# Patient Record
Sex: FEMALE | Race: WHITE | NOT HISPANIC OR LATINO | URBAN - METROPOLITAN AREA
[De-identification: names, ages, dates, MRNs, and addresses within clinical notes are randomized per-mention and may not be internally consistent; named-entity substitution may affect disease eponyms.]

---

## 2017-07-20 ENCOUNTER — CONVERSION ENCOUNTER (OUTPATIENT)
Dept: MAMMOGRAPHY | Facility: CLINIC | Age: 57
End: 2017-07-20

## 2018-09-06 DIAGNOSIS — E03.9 HYPOTHYROIDISM, UNSPECIFIED TYPE: Primary | ICD-10-CM

## 2018-09-06 NOTE — TELEPHONE ENCOUNTER
Pt called needed a new script for her levothyroxine 100 mcg pt has not been here since 10/04/2018 so I called the pharmacy and ordered her a 30 day supply with no refills   Pt has been notified that she must make an appt within the next 30 days

## 2018-09-10 RX ORDER — LEVOTHYROXINE SODIUM 0.1 MG/1
100 TABLET ORAL DAILY
Qty: 30 TABLET | Refills: 0 | Status: SHIPPED | OUTPATIENT
Start: 2018-09-10 | End: 2019-01-28 | Stop reason: SDUPTHER

## 2018-09-18 RX ORDER — VENLAFAXINE 50 MG/1
150 TABLET ORAL DAILY
COMMUNITY
End: 2018-12-02

## 2018-09-18 RX ORDER — FAMOTIDINE 20 MG/1
TABLET, FILM COATED ORAL EVERY 12 HOURS
COMMUNITY
End: 2018-12-02

## 2018-09-18 RX ORDER — LOSARTAN POTASSIUM 100 MG/1
100 TABLET ORAL DAILY
Refills: 0 | COMMUNITY
Start: 2018-07-23 | End: 2019-04-05 | Stop reason: SDUPTHER

## 2018-09-18 RX ORDER — VENLAFAXINE 75 MG/1
TABLET ORAL EVERY 12 HOURS
COMMUNITY
End: 2018-12-02

## 2018-09-18 RX ORDER — INFLUENZA VIRUS VACCINE 15; 15; 15; 15 UG/.5ML; UG/.5ML; UG/.5ML; UG/.5ML
SUSPENSION INTRAMUSCULAR
Refills: 0 | COMMUNITY
Start: 2018-09-04

## 2018-09-18 RX ORDER — VENLAFAXINE HYDROCHLORIDE 150 MG/1
150 CAPSULE, EXTENDED RELEASE ORAL DAILY
Refills: 0 | COMMUNITY
Start: 2018-07-05

## 2018-09-19 ENCOUNTER — OFFICE VISIT (OUTPATIENT)
Dept: FAMILY MEDICINE CLINIC | Facility: CLINIC | Age: 58
End: 2018-09-19
Payer: COMMERCIAL

## 2018-09-19 VITALS
HEART RATE: 80 BPM | HEIGHT: 65 IN | RESPIRATION RATE: 18 BRPM | WEIGHT: 218.4 LBS | SYSTOLIC BLOOD PRESSURE: 120 MMHG | DIASTOLIC BLOOD PRESSURE: 80 MMHG | BODY MASS INDEX: 36.39 KG/M2

## 2018-09-19 DIAGNOSIS — F32.A DEPRESSION, UNSPECIFIED DEPRESSION TYPE: ICD-10-CM

## 2018-09-19 DIAGNOSIS — I10 ESSENTIAL HYPERTENSION: ICD-10-CM

## 2018-09-19 DIAGNOSIS — E78.2 MIXED HYPERLIPIDEMIA: ICD-10-CM

## 2018-09-19 DIAGNOSIS — E55.9 VITAMIN D DEFICIENCY: ICD-10-CM

## 2018-09-19 DIAGNOSIS — E03.9 ACQUIRED HYPOTHYROIDISM: Primary | ICD-10-CM

## 2018-09-19 PROCEDURE — 99396 PREV VISIT EST AGE 40-64: CPT | Performed by: SPECIALIST

## 2018-09-19 RX ORDER — LOSARTAN POTASSIUM 100 MG/1
TABLET ORAL EVERY 24 HOURS
COMMUNITY
Start: 2017-09-22 | End: 2018-11-05 | Stop reason: SDUPTHER

## 2018-09-19 RX ORDER — FAMOTIDINE 20 MG/1
TABLET, FILM COATED ORAL EVERY 12 HOURS
COMMUNITY

## 2018-09-19 RX ORDER — LEVOTHYROXINE SODIUM 0.1 MG/1
TABLET ORAL EVERY 24 HOURS
COMMUNITY
Start: 2017-07-19 | End: 2018-11-05 | Stop reason: SDUPTHER

## 2018-09-19 NOTE — PROGRESS NOTES
Assessment/Plan: well  Check up   bp fine  Needs  Thyroid tests         Diagnoses and all orders for this visit:    Acquired hypothyroidism  -     CBC and differential; Future  -     Comprehensive metabolic panel; Future  -     Lipid panel; Future  -     T4, free; Future  -     TSH, 3rd generation; Future  -     Vitamin D 25 hydroxy; Future  -     UA w Reflex to Microscopic w Reflex to Culture -Lab Collect    Essential hypertension  -     CBC and differential; Future  -     Comprehensive metabolic panel; Future  -     Lipid panel; Future  -     T4, free; Future  -     TSH, 3rd generation; Future  -     Vitamin D 25 hydroxy; Future  -     UA w Reflex to Microscopic w Reflex to Culture -Lab Collect    Mixed hyperlipidemia  -     CBC and differential; Future  -     Comprehensive metabolic panel; Future  -     Lipid panel; Future  -     T4, free; Future  -     TSH, 3rd generation; Future  -     Vitamin D 25 hydroxy; Future  -     UA w Reflex to Microscopic w Reflex to Culture -Lab Collect    Vitamin D deficiency  -     CBC and differential; Future  -     Comprehensive metabolic panel; Future  -     Lipid panel; Future  -     T4, free; Future  -     TSH, 3rd generation; Future  -     Vitamin D 25 hydroxy; Future  -     UA w Reflex to Microscopic w Reflex to Culture -Lab Collect    Depression, unspecified depression type  -     CBC and differential; Future  -     Comprehensive metabolic panel; Future  -     Lipid panel; Future  -     T4, free; Future  -     TSH, 3rd generation; Future  -     Vitamin D 25 hydroxy; Future  -     UA w Reflex to Microscopic w Reflex to Culture -Lab Collect    Other orders  -     famotidine (PEPCID) 20 mg tablet; Every 12 hours  -     FLUARIX QUADRIVALENT 0 5 ML ADRIANA; inject 0 5 milliliter intramuscularly  -     losartan (COZAAR) 100 MG tablet; Take 100 mg by mouth daily  -     venlafaxine (EFFEXOR) 50 mg tablet; Every 12 hours  -     venlafaxine (EFFEXOR) 75 mg tablet;  Every 12 hours  - venlafaxine (EFFEXOR-XR) 150 mg 24 hr capsule; Take 150 mg by mouth daily  -     famotidine (PEPCID) 20 mg tablet; Every 12 hours  -     levothyroxine 100 mcg tablet; every 24 hours  -     losartan (COZAAR) 100 MG tablet; every 24 hours          Subjective:      Patient ID: Malachi Deleon is a 63 y o  female  64 yo  Doing   No  Acute  Complaints    Needs  Gyn  anf =d  Dental  care        The following portions of the patient's history were reviewed and updated as appropriate: allergies, current medications, past family history, past medical history, past social history, past surgical history and problem list     Review of Systems   Constitutional: Negative for activity change, appetite change, chills, diaphoresis, fatigue, fever and unexpected weight change  HENT: Negative for sinus pressure and sneezing  Eyes: Negative for visual disturbance  Respiratory: Negative for cough, chest tightness and wheezing  Gastrointestinal: Negative for abdominal distention and abdominal pain  Genitourinary: Negative for dysuria  Musculoskeletal: Positive for back pain  Negative for arthralgias, gait problem, joint swelling, myalgias and neck pain  Neurological: Negative for dizziness, tremors, seizures, syncope, facial asymmetry, speech difficulty, weakness, light-headedness, numbness and headaches  Psychiatric/Behavioral: Negative for agitation, behavioral problems, confusion, decreased concentration, dysphoric mood, hallucinations and self-injury  The patient is not nervous/anxious and is not hyperactive  Objective:      /80 (BP Location: Left arm, Patient Position: Sitting, Cuff Size: Standard)   Pulse 80   Resp 18   Ht 5' 5" (1 651 m)   Wt 99 1 kg (218 lb 6 4 oz)   BMI 36 34 kg/m²          Physical Exam   Constitutional: She is oriented to person, place, and time  She appears well-developed and well-nourished  No distress  HENT:   Head: Normocephalic     Right Ear: External ear normal  Left Ear: External ear normal    Mouth/Throat: Oropharynx is clear and moist    Eyes: Conjunctivae are normal  Pupils are equal, round, and reactive to light  Right eye exhibits no discharge  Left eye exhibits no discharge  No scleral icterus  Neck: JVD present  Cardiovascular: Normal rate, regular rhythm, normal heart sounds and intact distal pulses  Exam reveals no gallop  No murmur heard  Pulmonary/Chest: Effort normal  No respiratory distress  She has no wheezes  She has no rales  Abdominal: She exhibits no distension  Musculoskeletal: She exhibits no edema, tenderness or deformity  Neurological: She is alert and oriented to person, place, and time  Skin: Skin is warm and dry  No rash noted  She is not diaphoretic  No erythema  No pallor  Psychiatric: She has a normal mood and affect   Her behavior is normal

## 2018-10-10 DIAGNOSIS — E03.9 HYPOTHYROIDISM, UNSPECIFIED TYPE: Primary | ICD-10-CM

## 2018-10-10 RX ORDER — LEVOTHYROXINE SODIUM 0.1 MG/1
TABLET ORAL
Qty: 30 TABLET | Refills: 0 | Status: SHIPPED | OUTPATIENT
Start: 2018-10-10 | End: 2018-11-05 | Stop reason: SDUPTHER

## 2018-11-05 DIAGNOSIS — E03.9 HYPOTHYROIDISM, UNSPECIFIED TYPE: ICD-10-CM

## 2018-11-05 DIAGNOSIS — I10 HYPERTENSION, UNSPECIFIED TYPE: Primary | ICD-10-CM

## 2018-11-05 RX ORDER — LEVOTHYROXINE SODIUM 0.1 MG/1
TABLET ORAL
Qty: 30 TABLET | Refills: 0 | Status: SHIPPED | OUTPATIENT
Start: 2018-11-05 | End: 2018-12-02

## 2018-11-05 RX ORDER — LOSARTAN POTASSIUM 100 MG/1
100 TABLET ORAL EVERY 24 HOURS
Qty: 90 TABLET | Refills: 1 | Status: SHIPPED | OUTPATIENT
Start: 2018-11-05 | End: 2018-12-02

## 2018-11-05 RX ORDER — LEVOTHYROXINE SODIUM 0.1 MG/1
100 TABLET ORAL EVERY 24 HOURS
Qty: 90 TABLET | Refills: 1 | Status: SHIPPED | OUTPATIENT
Start: 2018-11-05 | End: 2018-12-02

## 2018-12-02 ENCOUNTER — ANESTHESIA EVENT (INPATIENT)
Dept: PERIOP | Facility: HOSPITAL | Age: 58
DRG: 493 | End: 2018-12-02
Payer: COMMERCIAL

## 2018-12-02 ENCOUNTER — HOSPITAL ENCOUNTER (INPATIENT)
Facility: HOSPITAL | Age: 58
LOS: 4 days | Discharge: NURSING, REHAB OR LONG-TERM CARE | DRG: 493 | End: 2018-12-06
Attending: EMERGENCY MEDICINE | Admitting: PODIATRIST
Payer: COMMERCIAL

## 2018-12-02 ENCOUNTER — APPOINTMENT (EMERGENCY)
Dept: RADIOLOGY | Facility: HOSPITAL | Age: 58
DRG: 493 | End: 2018-12-02
Payer: COMMERCIAL

## 2018-12-02 ENCOUNTER — APPOINTMENT (INPATIENT)
Dept: RADIOLOGY | Facility: HOSPITAL | Age: 58
DRG: 493 | End: 2018-12-02
Payer: COMMERCIAL

## 2018-12-02 DIAGNOSIS — Z98.890 POST-OPERATIVE STATE: ICD-10-CM

## 2018-12-02 DIAGNOSIS — S82.821G: ICD-10-CM

## 2018-12-02 DIAGNOSIS — S82.831A CLOSED FRACTURE OF RIGHT DISTAL FIBULA: Primary | ICD-10-CM

## 2018-12-02 DIAGNOSIS — I10 HYPERTENSION, UNSPECIFIED TYPE: ICD-10-CM

## 2018-12-02 PROBLEM — E03.9 HYPOTHYROIDISM: Status: ACTIVE | Noted: 2018-12-02

## 2018-12-02 PROBLEM — S82.891A CLOSED FRACTURE OF RIGHT ANKLE: Status: ACTIVE | Noted: 2018-12-02

## 2018-12-02 PROBLEM — R26.2 AMBULATORY DYSFUNCTION: Status: ACTIVE | Noted: 2018-12-02

## 2018-12-02 LAB
ALBUMIN SERPL BCP-MCNC: 3.3 G/DL (ref 3.5–5)
ALP SERPL-CCNC: 102 U/L (ref 46–116)
ALT SERPL W P-5'-P-CCNC: 21 U/L (ref 12–78)
ANION GAP SERPL CALCULATED.3IONS-SCNC: 4 MMOL/L (ref 4–13)
AST SERPL W P-5'-P-CCNC: 13 U/L (ref 5–45)
ATRIAL RATE: 63 BPM
BILIRUB SERPL-MCNC: 0.35 MG/DL (ref 0.2–1)
BUN SERPL-MCNC: 18 MG/DL (ref 5–25)
CALCIUM SERPL-MCNC: 8.2 MG/DL (ref 8.3–10.1)
CHLORIDE SERPL-SCNC: 106 MMOL/L (ref 100–108)
CO2 SERPL-SCNC: 33 MMOL/L (ref 21–32)
CREAT SERPL-MCNC: 0.81 MG/DL (ref 0.6–1.3)
ERYTHROCYTE [DISTWIDTH] IN BLOOD BY AUTOMATED COUNT: 13.3 % (ref 11.6–15.1)
GFR SERPL CREATININE-BSD FRML MDRD: 81 ML/MIN/1.73SQ M
GLUCOSE SERPL-MCNC: 105 MG/DL (ref 65–140)
HCT VFR BLD AUTO: 40.4 % (ref 34.8–46.1)
HGB BLD-MCNC: 12.9 G/DL (ref 11.5–15.4)
MCH RBC QN AUTO: 30.7 PG (ref 26.8–34.3)
MCHC RBC AUTO-ENTMCNC: 31.9 G/DL (ref 31.4–37.4)
MCV RBC AUTO: 96 FL (ref 82–98)
P AXIS: 60 DEGREES
PLATELET # BLD AUTO: 179 THOUSANDS/UL (ref 149–390)
PMV BLD AUTO: 10.1 FL (ref 8.9–12.7)
POTASSIUM SERPL-SCNC: 3.6 MMOL/L (ref 3.5–5.3)
PR INTERVAL: 180 MS
PROT SERPL-MCNC: 6.2 G/DL (ref 6.4–8.2)
QRS AXIS: -12 DEGREES
QRSD INTERVAL: 104 MS
QT INTERVAL: 444 MS
QTC INTERVAL: 454 MS
RBC # BLD AUTO: 4.2 MILLION/UL (ref 3.81–5.12)
SODIUM SERPL-SCNC: 143 MMOL/L (ref 136–145)
T WAVE AXIS: 41 DEGREES
VENTRICULAR RATE: 63 BPM
WBC # BLD AUTO: 7.86 THOUSAND/UL (ref 4.31–10.16)

## 2018-12-02 PROCEDURE — 71046 X-RAY EXAM CHEST 2 VIEWS: CPT

## 2018-12-02 PROCEDURE — 80053 COMPREHEN METABOLIC PANEL: CPT | Performed by: STUDENT IN AN ORGANIZED HEALTH CARE EDUCATION/TRAINING PROGRAM

## 2018-12-02 PROCEDURE — 93010 ELECTROCARDIOGRAM REPORT: CPT | Performed by: INTERNAL MEDICINE

## 2018-12-02 PROCEDURE — 93005 ELECTROCARDIOGRAM TRACING: CPT

## 2018-12-02 PROCEDURE — 85027 COMPLETE CBC AUTOMATED: CPT | Performed by: STUDENT IN AN ORGANIZED HEALTH CARE EDUCATION/TRAINING PROGRAM

## 2018-12-02 PROCEDURE — 73610 X-RAY EXAM OF ANKLE: CPT

## 2018-12-02 PROCEDURE — 99284 EMERGENCY DEPT VISIT MOD MDM: CPT

## 2018-12-02 RX ORDER — OXYCODONE HYDROCHLORIDE AND ACETAMINOPHEN 5; 325 MG/1; MG/1
1 TABLET ORAL EVERY 4 HOURS PRN
Qty: 20 TABLET | Refills: 0 | Status: SHIPPED | OUTPATIENT
Start: 2018-12-02 | End: 2018-12-05 | Stop reason: HOSPADM

## 2018-12-02 RX ORDER — ACETAMINOPHEN 325 MG/1
650 TABLET ORAL EVERY 6 HOURS PRN
Status: DISCONTINUED | OUTPATIENT
Start: 2018-12-02 | End: 2018-12-06 | Stop reason: HOSPADM

## 2018-12-02 RX ORDER — FAMOTIDINE 20 MG/1
20 TABLET, FILM COATED ORAL 2 TIMES DAILY
Status: DISCONTINUED | OUTPATIENT
Start: 2018-12-02 | End: 2018-12-06 | Stop reason: HOSPADM

## 2018-12-02 RX ORDER — VENLAFAXINE HYDROCHLORIDE 150 MG/1
150 CAPSULE, EXTENDED RELEASE ORAL DAILY
Status: DISCONTINUED | OUTPATIENT
Start: 2018-12-02 | End: 2018-12-06 | Stop reason: HOSPADM

## 2018-12-02 RX ORDER — LEVOTHYROXINE SODIUM 0.1 MG/1
100 TABLET ORAL
Status: DISCONTINUED | OUTPATIENT
Start: 2018-12-03 | End: 2018-12-06 | Stop reason: HOSPADM

## 2018-12-02 RX ORDER — ONDANSETRON 2 MG/ML
4 INJECTION INTRAMUSCULAR; INTRAVENOUS EVERY 6 HOURS PRN
Status: DISCONTINUED | OUTPATIENT
Start: 2018-12-02 | End: 2018-12-06 | Stop reason: HOSPADM

## 2018-12-02 RX ORDER — LOSARTAN POTASSIUM 50 MG/1
100 TABLET ORAL DAILY
Status: DISCONTINUED | OUTPATIENT
Start: 2018-12-02 | End: 2018-12-06 | Stop reason: HOSPADM

## 2018-12-02 RX ORDER — OXYCODONE HYDROCHLORIDE AND ACETAMINOPHEN 5; 325 MG/1; MG/1
1 TABLET ORAL EVERY 4 HOURS PRN
Status: DISCONTINUED | OUTPATIENT
Start: 2018-12-02 | End: 2018-12-03

## 2018-12-02 RX ORDER — OXYCODONE HYDROCHLORIDE AND ACETAMINOPHEN 5; 325 MG/1; MG/1
1 TABLET ORAL ONCE
Status: COMPLETED | OUTPATIENT
Start: 2018-12-02 | End: 2018-12-02

## 2018-12-02 RX ORDER — IBUPROFEN 600 MG/1
600 TABLET ORAL EVERY 6 HOURS PRN
Status: DISCONTINUED | OUTPATIENT
Start: 2018-12-02 | End: 2018-12-06 | Stop reason: HOSPADM

## 2018-12-02 RX ADMIN — ENOXAPARIN SODIUM 40 MG: 40 INJECTION SUBCUTANEOUS at 10:03

## 2018-12-02 RX ADMIN — OXYCODONE HYDROCHLORIDE AND ACETAMINOPHEN 1 TABLET: 5; 325 TABLET ORAL at 01:58

## 2018-12-02 RX ADMIN — IBUPROFEN 600 MG: 600 TABLET, FILM COATED ORAL at 19:28

## 2018-12-02 RX ADMIN — VENLAFAXINE HYDROCHLORIDE 150 MG: 150 CAPSULE, EXTENDED RELEASE ORAL at 14:26

## 2018-12-02 RX ADMIN — FAMOTIDINE 20 MG: 20 TABLET ORAL at 14:26

## 2018-12-02 RX ADMIN — OXYCODONE HYDROCHLORIDE AND ACETAMINOPHEN 1 TABLET: 5; 325 TABLET ORAL at 14:26

## 2018-12-02 NOTE — NURSING NOTE
Spoke with cindy, [facility] representative  Aware OR case added for tomorrow, requested time 1700 and ankle set requested

## 2018-12-02 NOTE — ANESTHESIA PREPROCEDURE EVALUATION
Review of Systems/Medical History  Patient summary reviewed  Chart reviewed      Cardiovascular  Exercise tolerance (METS): <4,  Hypertension ,    Pulmonary  Negative pulmonary ROS        GI/Hepatic  Negative GI/hepatic ROS          Negative  ROS        Endo/Other  History of thyroid disease , hypothyroidism,      GYN  Negative gynecology ROS          Hematology  Negative hematology ROS      Musculoskeletal  Negative musculoskeletal ROS        Neurology  Negative neurology ROS      Psychology   Negative psychology ROS              Physical Exam    Airway    Mallampati score: II  TM Distance: <3 FB  Neck ROM: full     Dental       Cardiovascular  Rhythm: regular, Rate: normal,     Pulmonary  Breath sounds clear to auscultation,     Other Findings  caps      Anesthesia Plan  ASA Score- 3     Anesthesia Type- general with ASA Monitors  Additional Monitors:   Airway Plan:         Plan Factors- Patient instructed to abstain from smoking on day of procedure  Patient did not smoke on day of surgery  Induction- intravenous  Postoperative Plan-     Informed Consent- Anesthetic plan and risks discussed with patient

## 2018-12-02 NOTE — ED NOTES
Upon crutch teaching, pt was unsteady on crutches and feet  Was unable to adequately use crutches with non weight bearing ability  Dr Yeny Roger made aware        Butch Carrero  12/02/18 5181

## 2018-12-02 NOTE — ED PROVIDER NOTES
History  Chief Complaint   Patient presents with    Ankle Pain     Patient presents following mechanical fall, complaining of right lateral ankle pain  Patient is a 81-year-old female that presents for a right ankle injury  States that she was walking on the sidewalk near her house when she said that her foot slipped between the connection between the grass and the concrete  States that she rolled her ankle outward and fell  Patient complaining of right ankle pain  She is unable to bear weight  No other injuries noted  History provided by:  Patient   used: No    Ankle Injury   Location:  Right ankle  Quality:  Pain  Severity:  Moderate  Onset quality:  Sudden  Timing:  Constant  Progression:  Unchanged  Chronicity:  New  Associated symptoms: no abdominal pain, no chest pain, no headaches, no nausea and no vomiting        Prior to Admission Medications   Prescriptions Last Dose Informant Patient Reported? Taking? FLUARIX QUADRIVALENT 0 5 ML ADRIANA   Yes Yes   Sig: inject 0 5 milliliter intramuscularly   famotidine (PEPCID) 20 mg tablet   Yes Yes   Sig: Every 12 hours   levothyroxine 100 mcg tablet   No Yes   Sig: Take 1 tablet (100 mcg total) by mouth daily   losartan (COZAAR) 100 MG tablet   Yes Yes   Sig: Take 100 mg by mouth daily   venlafaxine (EFFEXOR-XR) 150 mg 24 hr capsule   Yes Yes   Sig: Take 150 mg by mouth daily      Facility-Administered Medications: None       Past Medical History:   Diagnosis Date    Fracture, ankle     Right    Pupil asymmetry        Past Surgical History:   Procedure Laterality Date    CYSTOSCOPY      EYE SURGERY      HIP SURGERY      REPAIR NONUNION / MALUNION METATARSAL / TARSAL BONES      Tarsal tunnel repair       History reviewed  No pertinent family history  I have reviewed and agree with the history as documented      Social History   Substance Use Topics    Smoking status: Never Smoker    Smokeless tobacco: Never Used   Deluux Courser Alcohol use No        Review of Systems   Cardiovascular: Negative for chest pain  Gastrointestinal: Negative for abdominal pain, nausea and vomiting  Musculoskeletal: Positive for arthralgias and joint swelling  Negative for back pain and neck pain  Skin: Negative for color change, pallor and wound  Allergic/Immunologic: Negative for immunocompromised state  Neurological: Negative for dizziness, light-headedness and headaches  All other systems reviewed and are negative  Physical Exam  Physical Exam   Constitutional: She is oriented to person, place, and time  She appears well-developed and well-nourished  No distress  HENT:   Head: Normocephalic and atraumatic  Eyes: Right eye exhibits no discharge  Left eye exhibits no discharge  Cardiovascular: Normal rate and intact distal pulses  Pulmonary/Chest: Effort normal  No stridor  No respiratory distress  Musculoskeletal: She exhibits tenderness  She exhibits no deformity  Right ankle: She exhibits decreased range of motion and swelling  She exhibits no deformity and normal pulse  Tenderness  Medial malleolus tenderness found  Neurological: She is alert and oriented to person, place, and time  Skin: Skin is warm and dry  She is not diaphoretic  Psychiatric: She has a normal mood and affect  Nursing note and vitals reviewed        Vital Signs  ED Triage Vitals   Temperature Pulse Respirations Blood Pressure SpO2   12/02/18 0125 12/02/18 0123 12/02/18 0123 12/02/18 0124 12/02/18 0123   98 °F (36 7 °C) 83 18 (!) 199/86 98 %      Temp Source Heart Rate Source Patient Position - Orthostatic VS BP Location FiO2 (%)   12/02/18 0125 12/02/18 0123 12/02/18 0123 12/02/18 0123 --   Oral Monitor Lying Right arm       Pain Score       12/02/18 0123       3           Vitals:    12/02/18 0123 12/02/18 0124 12/02/18 0459 12/02/18 0619   BP:  (!) 199/86 124/68 149/94   Pulse: 83  76 60   Patient Position - Orthostatic VS: Lying Lying Lying Sitting       Visual Acuity      ED Medications  Medications   oxyCODONE-acetaminophen (PERCOCET) 5-325 mg per tablet 1 tablet (1 tablet Oral Given 12/2/18 0158)       Diagnostic Studies  Results Reviewed     None                 XR ankle 3+ views RIGHT   ED Interpretation by Trent Holloway DO (12/02 5692)   Spiral fracture of the distal fibula                 Procedures  Orthopedic Injury  Date/Time: 12/2/2018 6:23 AM  Performed by: Hernan Snyder  Authorized by: Hernan Snyder     Patient Location:  ED  Other Assisting Provider: Yes (comment)    Verbal consent obtained?: Yes    Risks and benefits: Risks, benefits and alternatives were discussed    Consent given by:  Patient  Patient states understanding of procedure being performed: Yes    Radiology Images displayed and confirmed  If images not available, report reviewed: Yes    Patient identity confirmed:  Verbally with patient  Time out: Immediately prior to the procedure a time out was called    Injury location:  Lower leg  Location details:  Right lower leg  Injury type:  Fracture  Fracture type: lateral malleolus    Fracture type: lateral malleolus    Neurovascular status: Neurovascularly intact    Distal perfusion: normal    Neurological function: normal    Range of motion: normal    Local anesthesia used?: No    Manipulation performed?: No    Immobilization:  Splint  Splint type:  Sugar tong (and posterior)  Supplies used:  Ortho-Glass  Neurovascular status: Neurovascularly intact    Distal perfusion: normal    Neurological function: normal    Patient tolerance:  Patient tolerated the procedure well with no immediate complications           Phone Contacts  ED Phone Contact    ED Course                               MDM  Number of Diagnoses or Management Options  Closed fracture of right distal fibula: new and requires workup  Diagnosis management comments: X-ray shows a distal fibular fracture    Will place in a sugar-tong and posterior splint, give crutches and discharge with orthopedic follow-up  Patient follows with [facility] and wants to follow there     5:03 AM  Patient very unstable on her crutches  States that she has over 20 steps at home  She is to unsafe to send home  Will discuss with podiatry for admission  5:11 AM  Spoke with podiatry  They will come in to admit and evaluate the patient  Podiatry evaluated the patient  They take her to the OR tomorrow morning  Patient stable  Amount and/or Complexity of Data Reviewed  Tests in the radiology section of CPT®: ordered and reviewed  Discuss the patient with other providers: yes  Independent visualization of images, tracings, or specimens: yes    Patient Progress  Patient progress: stable    CritCare Time    Disposition  Final diagnoses:   Closed fracture of right distal fibula     Time reflects when diagnosis was documented in both MDM as applicable and the Disposition within this note     Time User Action Codes Description Comment    12/2/2018  3:57 AM Jennyfer Pena Add [A56 313C] Closed fracture of right distal fibula     12/2/2018  6:19 AM Maria Alejandra Powell Modify [U08 274L] Closed fracture of right distal fibula     12/2/2018  6:19 AM Maria Alejandra Powell Add [I10] Hypertension, unspecified type     12/2/2018  6:20 AM Maria Alejandra Powell Modify [O85 050U] Closed fracture of right distal fibula       ED Disposition     ED Disposition Condition Comment    Admit  Admitted to Podiatry  Inpatient Status  Podiatry service          Follow-up Information     Follow up With Specialties Details Why Contact Info Additional [address] [facility] Orthopedic Surgery Call today To schedule an appointment as soon as you can [facility] [phone]  [address] [place] [address], [place], [place], [phone]          Current Discharge Medication List START taking these medications    Details   oxyCODONE-acetaminophen (PERCOCET) 5-325 mg per tablet Take 1 tablet by mouth every 4 (four) hours as needed for moderate pain Max Daily Amount: 6 tablets  Qty: 20 tablet, Refills: 0    Associated Diagnoses: Closed fracture of right distal fibula         CONTINUE these medications which have NOT CHANGED    Details   famotidine (PEPCID) 20 mg tablet Every 12 hours      FLUARIX QUADRIVALENT 0 5 ML ADRIANA inject 0 5 milliliter intramuscularly  Refills: 0      levothyroxine 100 mcg tablet Take 1 tablet (100 mcg total) by mouth daily  Qty: 30 tablet, Refills: 0    Associated Diagnoses: Hypothyroidism, unspecified type      losartan (COZAAR) 100 MG tablet Take 100 mg by mouth daily  Refills: 0      venlafaxine (EFFEXOR-XR) 150 mg 24 hr capsule Take 150 mg by mouth daily  Refills: 0           No discharge procedures on file      ED Provider  Electronically Signed by           Jimena Borjas DO  12/02/18 9217

## 2018-12-02 NOTE — H&P
H&P Exam - [facility] 63 y o  female MRN: [id number]  Unit/Bed#: E2 -01 Encounter: [id number]    Assessment/Plan     Assessment:  1) Closed displaced fibular fracture of right ankle  - no skin tenting or soft tissue deficit noted    2) Ambulatory Dysfunction  - unable to remain NWB to RLE, will need further assessment by PT/OT    3) Hypertension  4) Hypothyroidism    Plan:  - XR reviewed, spiral fibular fracture of right ankle displaced approximately 6mm, unable to close reduce, will need surgical intervention, scheduled for Monday 12/3 for ORIF right ankle  - all home meds resumed, appreciate SLIM input on medical management as well as pre-op clearance  - Byrd compression dressing and posterior splint in place, elevate RLE  - WBS: NWB to RLE, PT/OT recommendation appreciated       History of Present Illness     HPI:  Spencer Antunez is a 63 y o  female who presents with right ankle pain  States she was walking outside in the dark and misstepped and rolled her right ankle  She says soon after she fell her right lower leg felt like jello and was unable to get up off the ground  She presented to [address] [address] ED and x-ray was performed and it was noted that she has spiral fibular fracture of right ankle  After placement of splint she attempted ambulation with crutches but unable to remain nonweightbearing to right lower extremity  Patient lives alone and is unable to get home at this time  Pain is well controlled to right ankle  Denies nausea, vomiting, fever, chills, shortness of breath, chest pain  Review of Systems   Constitutional: Negative  HENT: Negative  Eyes: Negative  Respiratory: Negative  Cardiovascular: Negative  Gastrointestinal: Negative  Musculoskeletal: right ankle pain  Skin:negative   Neurological: Negative          Historical Information   Past Medical History:   Diagnosis Date    Fracture, ankle     Right    Pupil asymmetry      Past Surgical History:   Procedure Laterality Date    CYSTOSCOPY      EYE SURGERY      HIP SURGERY      REPAIR NONUNION / MALUNION METATARSAL / TARSAL BONES      Tarsal tunnel repair     Social History   History   Alcohol Use No     History   Drug Use No     History   Smoking Status    Never Smoker   Smokeless Tobacco    Never Used     Family History: History reviewed  No pertinent family history  Meds/Allergies   Prescriptions Prior to Admission   Medication    famotidine (PEPCID) 20 mg tablet    FLUARIX QUADRIVALENT 0 5 ML ADRIANA    levothyroxine 100 mcg tablet    losartan (COZAAR) 100 MG tablet    venlafaxine (EFFEXOR-XR) 150 mg 24 hr capsule     Allergies   Allergen Reactions    No Active Allergies        Objective   First Vitals:   Blood Pressure: (!) 199/86 (12/02/18 0124)  Pulse: 83 (12/02/18 0123)  Temperature: 98 °F (36 7 °C) (12/02/18 0125)  Temp Source: Oral (12/02/18 0125)  Respirations: 18 (12/02/18 0123)  Height: 5' 5" (165 1 cm) (12/02/18 3235)  Weight - Scale: 96 6 kg (213 lb) (12/02/18 0123)  SpO2: 98 % (12/02/18 0123)    Current Vitals:   Blood Pressure: 149/94 (12/02/18 0619)  Pulse: 60 (12/02/18 0619)  Temperature: (!) 95 9 °F (35 5 °C) (12/02/18 0619)  Temp Source: Temporal (12/02/18 0139)  Respirations: 18 (12/02/18 0619)  Height: 5' 5" (165 1 cm) (12/02/18 7009)  Weight - Scale: 99 2 kg (218 lb 11 1 oz) (12/02/18 0619)  SpO2: 98 % (12/02/18 0459)        /94 (BP Location: Right arm)   Pulse 60   Temp (!) 95 9 °F (35 5 °C) (Temporal)   Resp 18   Ht 5' 5" (1 651 m)   Wt 99 2 kg (218 lb 11 1 oz)   SpO2 98%   BMI 36 39 kg/m²     General Appearance: Alert, cooperative, no distress    HEENT: Normocephalic head, moist mucous membranes  Ocular motion intact  Neck: Supple, no JVD    Heart: Regular rate and rhythm  Positive S1 & S2   No gallop  Lungs: Clear breath sounds bilaterally  No rales or wheezing  Abdomen: Soft  No tenderness    Positive bowel sounds  Back: No tenderness  Extremities:  No gross deformity noted to right ankle, no skin tenting or soft tissue deficit noted, no open wounds  Lab Results:   No visits with results within 1 Day(s) from this visit  Latest known visit with results is:   No results found for any previous visit  Imaging: I have personally reviewed pertinent films in PACS  EKG, Pathology, and Other Studies: I have personally reviewed pertinent reports          Code Status: No Order  Advance Directive and Living Will:      Power of :    POLST:

## 2018-12-02 NOTE — OCCUPATIONAL THERAPY NOTE
Occupational Therapy Cancellation        Patient Name: Nicki LOVEP Date: 12/2/2018    OT consult received  Chart reviewed and pt w/ spiral fracture of R ankle displaced and pt scheduled for ORIF on Right ankle on 12/3  Will cancel and follow post op for evaluation      Kimmy Lopez MS, OTR/L

## 2018-12-03 ENCOUNTER — APPOINTMENT (INPATIENT)
Dept: RADIOLOGY | Facility: HOSPITAL | Age: 58
DRG: 493 | End: 2018-12-03
Payer: COMMERCIAL

## 2018-12-03 ENCOUNTER — ANESTHESIA (INPATIENT)
Dept: PERIOP | Facility: HOSPITAL | Age: 58
DRG: 493 | End: 2018-12-03
Payer: COMMERCIAL

## 2018-12-03 PROBLEM — I45.10 RBBB (RIGHT BUNDLE BRANCH BLOCK): Status: ACTIVE | Noted: 2018-12-03

## 2018-12-03 LAB
ANION GAP SERPL CALCULATED.3IONS-SCNC: 8 MMOL/L (ref 4–13)
BUN SERPL-MCNC: 14 MG/DL (ref 5–25)
CALCIUM SERPL-MCNC: 8.3 MG/DL (ref 8.3–10.1)
CHLORIDE SERPL-SCNC: 106 MMOL/L (ref 100–108)
CO2 SERPL-SCNC: 28 MMOL/L (ref 21–32)
CREAT SERPL-MCNC: 0.71 MG/DL (ref 0.6–1.3)
ERYTHROCYTE [DISTWIDTH] IN BLOOD BY AUTOMATED COUNT: 13.2 % (ref 11.6–15.1)
GFR SERPL CREATININE-BSD FRML MDRD: 95 ML/MIN/1.73SQ M
GLUCOSE SERPL-MCNC: 104 MG/DL (ref 65–140)
HCT VFR BLD AUTO: 40.3 % (ref 34.8–46.1)
HGB BLD-MCNC: 12.9 G/DL (ref 11.5–15.4)
MCH RBC QN AUTO: 30.4 PG (ref 26.8–34.3)
MCHC RBC AUTO-ENTMCNC: 32 G/DL (ref 31.4–37.4)
MCV RBC AUTO: 95 FL (ref 82–98)
PLATELET # BLD AUTO: 170 THOUSANDS/UL (ref 149–390)
PMV BLD AUTO: 10.2 FL (ref 8.9–12.7)
POTASSIUM SERPL-SCNC: 3.3 MMOL/L (ref 3.5–5.3)
RBC # BLD AUTO: 4.25 MILLION/UL (ref 3.81–5.12)
SODIUM SERPL-SCNC: 142 MMOL/L (ref 136–145)
WBC # BLD AUTO: 5.67 THOUSAND/UL (ref 4.31–10.16)

## 2018-12-03 PROCEDURE — 80048 BASIC METABOLIC PNL TOTAL CA: CPT | Performed by: STUDENT IN AN ORGANIZED HEALTH CARE EDUCATION/TRAINING PROGRAM

## 2018-12-03 PROCEDURE — 73600 X-RAY EXAM OF ANKLE: CPT

## 2018-12-03 PROCEDURE — C1713 ANCHOR/SCREW BN/BN,TIS/BN: HCPCS | Performed by: PODIATRIST

## 2018-12-03 PROCEDURE — 0QSJ04Z REPOSITION RIGHT FIBULA WITH INTERNAL FIXATION DEVICE, OPEN APPROACH: ICD-10-PCS | Performed by: PODIATRIST

## 2018-12-03 PROCEDURE — 99222 1ST HOSP IP/OBS MODERATE 55: CPT | Performed by: INTERNAL MEDICINE

## 2018-12-03 PROCEDURE — 85027 COMPLETE CBC AUTOMATED: CPT | Performed by: STUDENT IN AN ORGANIZED HEALTH CARE EDUCATION/TRAINING PROGRAM

## 2018-12-03 PROCEDURE — 82948 REAGENT STRIP/BLOOD GLUCOSE: CPT

## 2018-12-03 DEVICE — SCREW CORT 3.5 X 12MM LCK LOPRFL: Type: IMPLANTABLE DEVICE | Site: ANKLE | Status: FUNCTIONAL

## 2018-12-03 DEVICE — SCREW CORT 3.5 X 18MM LCK LOPRFL: Type: IMPLANTABLE DEVICE | Site: ANKLE | Status: FUNCTIONAL

## 2018-12-03 DEVICE — SCREW CORT 3.5 X 20MM LOPRFL: Type: IMPLANTABLE DEVICE | Site: ANKLE | Status: FUNCTIONAL

## 2018-12-03 DEVICE — SCREW CORT 3.5 X 14MM LCK LOPRFL: Type: IMPLANTABLE DEVICE | Site: ANKLE | Status: FUNCTIONAL

## 2018-12-03 DEVICE — PLATE LCK 1/3 TUBL AVULSION DELTOID 8HL: Type: IMPLANTABLE DEVICE | Site: ANKLE | Status: FUNCTIONAL

## 2018-12-03 DEVICE — SCREW CORT 3.5 X 14MM LOPRFL: Type: IMPLANTABLE DEVICE | Site: ANKLE | Status: FUNCTIONAL

## 2018-12-03 RX ORDER — OXYCODONE HYDROCHLORIDE AND ACETAMINOPHEN 5; 325 MG/1; MG/1
1 TABLET ORAL EVERY 4 HOURS PRN
Status: DISCONTINUED | OUTPATIENT
Start: 2018-12-03 | End: 2018-12-06 | Stop reason: HOSPADM

## 2018-12-03 RX ORDER — SODIUM CHLORIDE 9 MG/ML
125 INJECTION, SOLUTION INTRAVENOUS CONTINUOUS
Status: DISCONTINUED | OUTPATIENT
Start: 2018-12-03 | End: 2018-12-04

## 2018-12-03 RX ORDER — ONDANSETRON 2 MG/ML
4 INJECTION INTRAMUSCULAR; INTRAVENOUS ONCE AS NEEDED
Status: DISCONTINUED | OUTPATIENT
Start: 2018-12-03 | End: 2018-12-03 | Stop reason: HOSPADM

## 2018-12-03 RX ORDER — MIDAZOLAM HYDROCHLORIDE 1 MG/ML
INJECTION INTRAMUSCULAR; INTRAVENOUS AS NEEDED
Status: DISCONTINUED | OUTPATIENT
Start: 2018-12-03 | End: 2018-12-03 | Stop reason: SURG

## 2018-12-03 RX ORDER — CEFAZOLIN SODIUM 1 G/3ML
INJECTION, POWDER, FOR SOLUTION INTRAMUSCULAR; INTRAVENOUS AS NEEDED
Status: DISCONTINUED | OUTPATIENT
Start: 2018-12-03 | End: 2018-12-03 | Stop reason: SURG

## 2018-12-03 RX ORDER — FENTANYL CITRATE/PF 50 MCG/ML
50 SYRINGE (ML) INJECTION
Status: DISCONTINUED | OUTPATIENT
Start: 2018-12-03 | End: 2018-12-03 | Stop reason: HOSPADM

## 2018-12-03 RX ORDER — FENTANYL CITRATE 50 UG/ML
INJECTION, SOLUTION INTRAMUSCULAR; INTRAVENOUS AS NEEDED
Status: DISCONTINUED | OUTPATIENT
Start: 2018-12-03 | End: 2018-12-03 | Stop reason: SURG

## 2018-12-03 RX ORDER — LIDOCAINE HYDROCHLORIDE 10 MG/ML
INJECTION, SOLUTION INFILTRATION; PERINEURAL AS NEEDED
Status: DISCONTINUED | OUTPATIENT
Start: 2018-12-03 | End: 2018-12-03 | Stop reason: SURG

## 2018-12-03 RX ORDER — OXYCODONE HYDROCHLORIDE AND ACETAMINOPHEN 5; 325 MG/1; MG/1
2 TABLET ORAL EVERY 4 HOURS PRN
Status: DISCONTINUED | OUTPATIENT
Start: 2018-12-03 | End: 2018-12-06 | Stop reason: HOSPADM

## 2018-12-03 RX ORDER — CEFAZOLIN SODIUM 2 G/50ML
2000 SOLUTION INTRAVENOUS ONCE
Status: COMPLETED | OUTPATIENT
Start: 2018-12-03 | End: 2018-12-04

## 2018-12-03 RX ORDER — ONDANSETRON 2 MG/ML
INJECTION INTRAMUSCULAR; INTRAVENOUS AS NEEDED
Status: DISCONTINUED | OUTPATIENT
Start: 2018-12-03 | End: 2018-12-03 | Stop reason: SURG

## 2018-12-03 RX ORDER — PROPOFOL 10 MG/ML
INJECTION, EMULSION INTRAVENOUS AS NEEDED
Status: DISCONTINUED | OUTPATIENT
Start: 2018-12-03 | End: 2018-12-03 | Stop reason: SURG

## 2018-12-03 RX ADMIN — LIDOCAINE HYDROCHLORIDE 50 MG: 10 INJECTION, SOLUTION INFILTRATION; PERINEURAL at 20:16

## 2018-12-03 RX ADMIN — SODIUM CHLORIDE 125 ML/HR: 0.9 INJECTION, SOLUTION INTRAVENOUS at 22:33

## 2018-12-03 RX ADMIN — SODIUM CHLORIDE 125 ML/HR: 0.9 INJECTION, SOLUTION INTRAVENOUS at 22:41

## 2018-12-03 RX ADMIN — VENLAFAXINE HYDROCHLORIDE 150 MG: 150 CAPSULE, EXTENDED RELEASE ORAL at 09:46

## 2018-12-03 RX ADMIN — CEFAZOLIN SODIUM 2000 MG: 2 SOLUTION INTRAVENOUS at 22:34

## 2018-12-03 RX ADMIN — FENTANYL CITRATE 100 MCG: 50 INJECTION, SOLUTION INTRAMUSCULAR; INTRAVENOUS at 19:26

## 2018-12-03 RX ADMIN — ONDANSETRON HYDROCHLORIDE 4 MG: 2 INJECTION, SOLUTION INTRAVENOUS at 20:23

## 2018-12-03 RX ADMIN — PROPOFOL 200 MG: 10 INJECTION, EMULSION INTRAVENOUS at 20:16

## 2018-12-03 RX ADMIN — CEFAZOLIN 2000 MG: 1 INJECTION, POWDER, FOR SOLUTION INTRAVENOUS at 20:19

## 2018-12-03 RX ADMIN — LOSARTAN POTASSIUM 100 MG: 50 TABLET, FILM COATED ORAL at 09:46

## 2018-12-03 RX ADMIN — SODIUM CHLORIDE 125 ML/HR: 0.9 INJECTION, SOLUTION INTRAVENOUS at 19:51

## 2018-12-03 RX ADMIN — LEVOTHYROXINE SODIUM 100 MCG: 100 TABLET ORAL at 06:10

## 2018-12-03 RX ADMIN — FAMOTIDINE 20 MG: 20 TABLET ORAL at 09:46

## 2018-12-03 RX ADMIN — SODIUM CHLORIDE 125 ML/HR: 0.9 INJECTION, SOLUTION INTRAVENOUS at 11:48

## 2018-12-03 RX ADMIN — SODIUM CHLORIDE: 0.9 INJECTION, SOLUTION INTRAVENOUS at 21:08

## 2018-12-03 RX ADMIN — FAMOTIDINE 20 MG: 20 TABLET ORAL at 18:36

## 2018-12-03 RX ADMIN — MIDAZOLAM 4 MG: 1 INJECTION INTRAMUSCULAR; INTRAVENOUS at 19:26

## 2018-12-03 NOTE — OCCUPATIONAL THERAPY NOTE
Occupational Therapy Cancellation Note        Patient Name: Tramaine Helton  [id number] Date: 12/3/2018    OT consult received  Pt admitted w/ R ankle fx, plan for OR for repair today, will follow post-op w/ new WB orders needed      Fredi Lieberman MS, OTR/L

## 2018-12-03 NOTE — PROGRESS NOTES
Progress Note - [address] 63 y o  female MRN: [id number]  Unit/Bed#: E2 -01 Encounter: [id number]    Assessment:  1  Closed displaced fibular fracture of right ankle  2  Ambulatory Dysfunction  3  Hypertension  4  Hypothyroidism    Plan:  · Patient will be going to the OR today with Dr Trev Casey for a right ankle ORIF   · NPO status confirmed  · The benefits, risks, and alternatives of the surgery were discussed with the patient  Consent was signed and is in chart  · All questions/concerns were answered  No guarantees given to outcome of procedure  · Personally spoke to Dee Hebert about pre-op clearance  They will evaluated the patient prior to procedure  · WBS: NWB RLE; PT/OT consulted  Will follow patient postop  Subjective/Objective   Chief Complaint:   Chief Complaint   Patient presents with    Ankle Pain     Patient presents following mechanical fall, complaining of right lateral ankle pain  Subjective: 63 y o  y/o female was seen and evaluated at bedside in no acute distress, nontoxic in appearance  Patient denies any recent nausea, vomiting, fever, chills, shortness of breath, chest pains       Blood pressure 118/75, pulse 66, temperature 98 2 °F (36 8 °C), temperature source Temporal, resp  rate 18, height 5' 5" (1 651 m), weight 99 2 kg (218 lb 11 1 oz), SpO2 97 %  ,Body mass index is 36 39 kg/m²  Invasive Devices     Peripheral Intravenous Line            Peripheral IV 12/02/18 Right Hand less than 1 day                Physical Exam:   General: Alert, cooperative and no distress  Lungs: Non labored breathing  Heart: Positive S1, S2  Abdomen: Soft, non-tender  Extremity:     NVS at baseline B/l  MSK function at B/l   No calf tenderness noted B/l     RLE:  Posterior splint intact      Lab, Imaging and other studies:   CBC:   Lab Results   Component Value Date    WBC 5 67 12/03/2018    HGB 12 9 12/03/2018    HCT 40 3 12/03/2018    MCV 95 12/03/2018     12/03/2018    MCH 30 4 12/03/2018    MCHC 32 0 12/03/2018    RDW 13 2 12/03/2018    MPV 10 2 12/03/2018   , CMP:   Lab Results   Component Value Date    SODIUM 142 12/03/2018    K 3 3 (L) 12/03/2018     12/03/2018    CO2 28 12/03/2018    BUN 14 12/03/2018    CREATININE 0 71 12/03/2018    CALCIUM 8 3 12/03/2018    EGFR 95 12/03/2018       Imaging: I have personally reviewed pertinent films in PACS  EKG, Pathology, and Other Studies: I have personally reviewed pertinent reports    VTE Pharmacologic Prophylaxis: Enoxaparin (Lovenox)

## 2018-12-03 NOTE — PHYSICAL THERAPY NOTE
Physical Therapy Cancellation Note    PT order received  Pt admitted for R ankle fx, plan for fx repair today  Will follow post-op  Podiatry to reconsult PT/OT post-op & update WBS & activity orders as appropriate  Nsg notified   Eliana Garcia, PT

## 2018-12-03 NOTE — UTILIZATION REVIEW
Initial Clinical Review    Admission: Date/Time/Statement: 12/2/18 @ 0548     Orders Placed This Encounter   Procedures    Inpatient Admission (expected length of stay for this patient is greater than two midnights)     Standing Status:   Standing     Number of Occurrences:   1     Order Specific Question:   Admitting Physician     Answer:   Martin Garcia     Order Specific Question:   Level of Care     Answer:   Med Surg [16]     Order Specific Question:   Estimated length of stay     Answer:   More than 2 Midnights     Order Specific Question:   Certification     Answer:   I certify that inpatient services are medically necessary for this patient for a duration of greater than two midnights  See H&P and MD Progress Notes for additional information about the patient's course of treatment  ED: Date/Time/Mode of Arrival:   ED Arrival Information     Expected Arrival Acuity Means of Arrival Escorted By Service Admission Type    - 12/2/2018 01:20 Less Urgent Amb[facility] Urgent    Arrival Complaint    Fall           Chief Complaint:   Chief Complaint   Patient presents with    Ankle Pain     Patient presents following mechanical fall, complaining of right lateral ankle pain  History of Illness: 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 presents with right ankle pain  States she was walking outside in the dark and misstepped and rolled her right ankle  She says soon after she fell her right lower leg felt like jello and was unable to get up off the ground  She presented to [facility] ED and x-ray was performed and it was noted that she has spiral fibular fracture of right ankle  After placement of splint she attempted ambulation with crutches but unable to remain nonweightbearing to right lower extremity  Patient lives alone and is unable to get home at this time  Pain is well controlled to right ankle   Denies nausea, vomiting, fever, chills, shortness of breath, chest pain  ED Vital Signs:   ED Triage Vitals   Temperature Pulse Respirations Blood Pressure SpO2   12/02/18 0125 12/02/18 0123 12/02/18 0123 12/02/18 0124 12/02/18 0123   98 °F (36 7 °C) 83 18 (!) 199/86 98 %      Temp Source Heart Rate Source Patient Position - Orthostatic VS BP Location FiO2 (%)   12/02/18 0125 12/02/18 0123 12/02/18 0123 12/02/18 0123 --   Oral Monitor Lying Right arm       Pain Score       12/02/18 0123       3        Wt Readings from Last 1 Encounters:   12/02/18 99 2 kg (218 lb 11 1 oz)       Vital Signs (abnormal):    above    Abnormal Labs/Diagnostic Test Results:    X ray  R  Ankle:    Minimally displaced, oblique fracture distal fibula  Mild widening of the medial ankle mortise  ED Treatment:   Medication Administration from 12/02/2018 0120 to 12/02/2018 9668       Date/Time Order Dose Route Action Action by Comments     12/02/2018 0158 oxyCODONE-acetaminophen (PERCOCET) 5-325 mg per tablet 1 tablet 1 tablet Oral Given Mikaela Harding RN           Past Medical/Surgical History:    Active Ambulatory Problems     Diagnosis Date Noted    No Active Ambulatory Problems     Resolved Ambulatory Problems     Diagnosis Date Noted    No Resolved Ambulatory Problems     Past Medical History:   Diagnosis Date    Fracture, ankle     Pupil asymmetry        Admitting Diagnosis: Ankle pain [M25 579]  Closed fracture of right distal fibula [S82 831A]    Age/Sex: 63 y o  female    Assessment/Plan: Closed displaced fibular fracture of right ankle  - no skin tenting or soft tissue deficit noted     2) Ambulatory Dysfunction  - unable to remain NWB to RLE, will need further assessment by PT/OT     3) Hypertension  4) Hypothyroidism     Plan:  - XR reviewed, spiral fibular fracture of right ankle displaced approximately 6mm, unable to close reduce, will need surgical intervention, scheduled for Monday 12/3 for ORIF right ankle  - all home meds resumed, appreciate SLIM input on medical management as well as pre-op clearance  - Byrd compression dressing and posterior splint in place, elevate RLE  - WBS: NWB to RLE, PT/OT recommendation appreciated     Admission Orders:    IP  12/2  @  0548  Scheduled Meds:   Current Facility-Administered Medications:  acetaminophen 650 mg Oral Q6H PRN Richelle Rob, DPM   enoxaparin 40 mg Subcutaneous Daily Richelle Rob, DPM   famotidine 20 mg Oral BID Richelle Rob, DPM   ibuprofen 600 mg Oral Q6H PRN Richelle Rob, DPM   [START ON 12/3/2018] levothyroxine 100 mcg Oral Daily Before Breakfast Richelle Rob, DPM   losartan 100 mg Oral Daily Richelle Rob, DPM   magnesium hydroxide 30 mL Oral Daily PRN Richelle Rob, DPM   ondansetron 4 mg Intravenous Q6H PRN Richelle Rob, DPM   oxyCODONE-acetaminophen 1 tablet Oral Q4H PRN Richelle Rob, DPM   venlafaxine 150 mg Oral Daily Richelle Rob, DPM     Continuous Infusions:    PRN Meds:   acetaminophen    ibuprofen    magnesium hydroxide    ondansetron    oxyCODONE-acetaminophen     Cons  IM  PT/OT  Plan  OR  12/3      [address] [place] [address] Utilization Review Department  Phone: [phone]; Fax [phone]  [email or web address]  ATTENTION: Please call with any questions or concerns to [phone]  and carefully listen to the prompts so that you are directed to the right person  Send all requests for admission clinical reviews, approved or denied determinations and any other requests to fax [phone]   All voicemails are confidential

## 2018-12-03 NOTE — PLAN OF CARE
Problem: Potential for Falls  Goal: Patient will remain free of falls  INTERVENTIONS:  - Assess patient frequently for physical needs  -  Identify cognitive and physical deficits and behaviors that affect risk of falls    -  [place] fall precautions as indicated by assessment   - Educate patient/family on patient safety including physical limitations  - Instruct patient to call for assistance with activity based on assessment  - Modify environment to reduce risk of injury  - Consider OT/PT consult to assist with strengthening/mobility   Outcome: Progressing
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
## 2018-12-03 NOTE — ASSESSMENT & PLAN NOTE
Fracture of distal fibula  After mechanical fall; patient without cardiopulmonary disease  At baseline can ambulate up a flight of steps without chest pain or shortness of breath  Unclear chronicity of RBBB    Acceptable risk to proceed with ankle surgery per podiatry

## 2018-12-04 PROBLEM — R50.82 POSTOPERATIVE FEVER: Status: ACTIVE | Noted: 2018-12-04

## 2018-12-04 LAB
GLUCOSE SERPL-MCNC: 61 MG/DL (ref 65–140)
GLUCOSE SERPL-MCNC: 84 MG/DL (ref 65–140)

## 2018-12-04 PROCEDURE — 99232 SBSQ HOSP IP/OBS MODERATE 35: CPT | Performed by: INTERNAL MEDICINE

## 2018-12-04 PROCEDURE — G8978 MOBILITY CURRENT STATUS: HCPCS

## 2018-12-04 PROCEDURE — 97167 OT EVAL HIGH COMPLEX 60 MIN: CPT

## 2018-12-04 PROCEDURE — G8979 MOBILITY GOAL STATUS: HCPCS

## 2018-12-04 PROCEDURE — G8988 SELF CARE GOAL STATUS: HCPCS

## 2018-12-04 PROCEDURE — G8987 SELF CARE CURRENT STATUS: HCPCS

## 2018-12-04 PROCEDURE — 97163 PT EVAL HIGH COMPLEX 45 MIN: CPT

## 2018-12-04 RX ADMIN — SODIUM CHLORIDE 125 ML/HR: 0.9 INJECTION, SOLUTION INTRAVENOUS at 15:01

## 2018-12-04 RX ADMIN — OXYCODONE HYDROCHLORIDE AND ACETAMINOPHEN 2 TABLET: 5; 325 TABLET ORAL at 19:59

## 2018-12-04 RX ADMIN — FAMOTIDINE 20 MG: 20 TABLET ORAL at 17:55

## 2018-12-04 RX ADMIN — OXYCODONE HYDROCHLORIDE AND ACETAMINOPHEN 1 TABLET: 5; 325 TABLET ORAL at 14:16

## 2018-12-04 RX ADMIN — FAMOTIDINE 20 MG: 20 TABLET ORAL at 08:34

## 2018-12-04 RX ADMIN — LEVOTHYROXINE SODIUM 100 MCG: 100 TABLET ORAL at 05:53

## 2018-12-04 RX ADMIN — ENOXAPARIN SODIUM 40 MG: 40 INJECTION SUBCUTANEOUS at 08:34

## 2018-12-04 RX ADMIN — VENLAFAXINE HYDROCHLORIDE 150 MG: 150 CAPSULE, EXTENDED RELEASE ORAL at 09:20

## 2018-12-04 NOTE — PROGRESS NOTES
Postop check  Patient resting comfortably in bed  She is concerned because she cannot feel or move her toes on the right foot    O: CRT digits brisk, I am able to reach under cast and feel DP pulse  No current sensation to RLE, no pain at knee or thigh    A: POD #1 Right ankle ORIF with popliteal block    Plan:  1  Postop Xrays show good anatomic reduction with hardware  NWB RLE  2  PT to see today, DC planning, will need lovenox for 30 days, recommend STR as she has fall history and numerous home issues with stairs and independence  3  Popliteal block should wear off later today/tonight   I tried to reassure her that this was normal following the block, She is no current pain

## 2018-12-04 NOTE — NURSING NOTE
Blood sugar ordered pre-op, initial result at 1938 61, rechecked d/t false reading r/t alcohol residue  1940 result 84  Dr Parks Ast aware

## 2018-12-04 NOTE — OP NOTE
OPERATIVE REPORT  PATIENT NAME: Belkis Cruz    :  1960  MRN: [id number]  Pt Location: [facility] OR ROOM 02    SURGERY DATE: 12/3/2018    Surgeon(s) and Role:     * Rena Esposito, ABDULAZIZ - Primary     * Ricardo Leyden, ABDULAZIZ - Assisting- 1st assist     * Monica Cadena, ABDULAZIZ - Assisting- 2nd assist    Preop Diagnosis:  Closed fracture of right distal fibula [S82 831A]    Post-Op Diagnosis Codes:     * Closed fracture of right distal fibula [S82 831A]    Procedure(s) (LRB):  OPEN REDUCTION W/ INTERNAL FIXATION (ORIF) ANKLE (Right)    Specimen(s):  * No specimens in log *    Estimated Blood Loss:   Minimal    Drains:  none     Anesthesia Type:   Choice    Hemostasis:   PTT at 300mmHg for 45 minutes     Materials:   1) Arthrex 1/3 tubular 8 hole plate   2) Arthrex 3 5 cortical screw locking x12mm, x14mm, x18mm   3) Arthrex 3 5 cortical screw non locking x20mm x2   4) Arthrex 3 5 cortical screw non locking x14 x3    Operative Indications:  Closed fracture of right distal fibula [H34 361Q]    Operative Findings:  As expected with diagnosis  Complications:   None    Procedure and Technique:  Under mild IV sedation, the patient was wheeled back into the operating room and placed on the operating table in the supine position  Preoperatively, patient received a popliteal block of the right lower extremity  A right pneumatic thigh tourniquet was placed about the patient's right thigh  The right lower extremity was then scrubbed, prepped and draped in the usual aseptic manner  The lower extremity was elevated and exsanguinated using an Esmarch bandage  The tourniquet was then inflated       Attention was then directed to the lateral aspect of the patient's right leg where a linear incision was made overlying the patient's fibula  The incision was deepened down in a layered fashion through skin and subcutaneous layers, superficial and deep fascia and periosteum utilizing a combination of sharp and blunt dissection  Care was taken to retract all vital neurovascular structures  All bleeders were cauterized and ligated as necessary       Once down to the level of the periosteum, an incision was then made overlying the periosteum of the fibula and the periosteum was reflected off the fibula utilizing a combination key elevator and 15 blade  Once this was accomplished, the spiral oblique fracture of the right fibula was visualized and consistent with the previous x-rays  Utilizing irrigation with normal sterile saline, the fracture site was examined  The fracture was temporarily reduced utilizing bone reduction clamps  At this point, clinically, the fracture was reduced and compressed  However, intraoperative fluoroscopy was also used to visualize the right fibula and excellent alignment of the fracture site with restoration of the length of the fibula and reduction of any angulation or rotation was noted       At this point, two 3 5 mm fully threaded cortical screw was placed from anterior and posterior at the angle of 45 degrees to the fracture site through the fracture line  This was done utilizing standard AO lag screw technique and the screw was tightened down  Excellent compression was noted at the fracture site and the alignment and length of the fibula was noted to still be in excellent position and verified utilizing the intraoperative fluoroscopy  Next, utilizing 3 5 mm locking screws proximally and distally an Arthrex posterior lateral distal fibular plate was placed along the lateral aspect of the patient's fibula  Care was taken to contour the plate to the natural contour of the fibula  Intraoperative fluoroscopy was used throughout this process and was checked again after all screws and plate was applied  Screws were deemed proper length and plate was fairly adhered to the fibula   The area was then copiously flushed with normal sterile saline and closed in a layered fashion utilizing 2-0 Vicryl, 3-0 vicryl, and stainless steel surgical staples       Dressings were then applied consisting of Xeroform, 4 x 4s,  Kerlix and sterile Webril, with morataya compression and fiberglass shell  The tourniquet was deflated and prompt capillary response was noted to the lower extremity  The patient tolerated the procedure and anesthesia well and was then transferred to PACU with vital signs stable      Patient Disposition:  PACU  and hemodynamically stable    SIGNATURE: Espinoza Nielsen DPM  DATE: December 3, 2018  TIME: 9:26 PM

## 2018-12-04 NOTE — ASSESSMENT & PLAN NOTE
Postoperative fever likely atelectasis  No evidence of acute infection    Advised incentive spirometer

## 2018-12-04 NOTE — ANESTHESIA PROCEDURE NOTES
Peripheral Block    Patient location during procedure: holding area  Start time: 12/3/2018 7:25 PM  Reason for block: at surgeon's request and post-op pain management  Staffing  Anesthesiologist: Ashley Newell  Performed: anesthesiologist   Preanesthetic Checklist  Completed: patient identified, site marked, surgical consent, pre-op evaluation, timeout performed, IV checked, risks and benefits discussed and monitors and equipment checked  Peripheral Block  Patient position: sitting  Prep: ChloraPrep  Patient monitoring: frequent blood pressure checks  Block type: popliteal  Laterality: right  Injection technique: single-shot  Procedures: ultrasound guided and nerve stimulator  Local infiltration: ropivacaine  Infiltration strength: 0 5 %  Dose: 30 mL  Needle  Needle type: Stimuplex   Needle gauge: 21 G  Needle length: 10 cm  Needle localization: ultrasound guidance and nerve stimulator  Assessment  Injection assessment: incremental injection, local visualized surrounding nerve on ultrasound, negative aspiration for heme and no paresthesia on injection  Heart rate change: no  Slow fractionated injection: yes  Post-procedure:  site cleaned  patient tolerated the procedure well with no immediate complications

## 2018-12-04 NOTE — PROGRESS NOTES
Progress Note - Matt Cotton 1960, 63 y o  female MRN: [id number]    Unit/Bed#: E2 -01 Encounter: [id number]    Primary Care Provider: Dillon Nguyen MD   Date and time admitted to hospital: 12/2/2018  1:20 AM        Assessment and Plan  * Closed fracture of right distal fibula   Assessment & Plan    Patient status post ORIF  Pain controlled     Postoperative fever   Assessment & Plan    Postoperative fever likely atelectasis  No evidence of acute infection  Advised incentive spirometer     GERD (gastroesophageal reflux disease)   Assessment & Plan    Continue famotidine     RBBB (right bundle branch block)   Assessment & Plan    Unclear chronicity  Asymptomatic; advised to consider outpatient echocardiogram     Depression (emotion)   Assessment & Plan    Depression  Mood stable on venlafaxine     Essential hypertension   Assessment & Plan    Essential hypertension; continue losartan 100 mg daily with hold parameters     Hypothyroidism   Assessment & Plan    Hypothyroidism  Continue levothyroxine     Ambulatory dysfunction   Assessment & Plan    Would benefit from rehab placement; PT/OT recommending same     VTE Pharmacologic Prophylaxis: Enoxaparin (Lovenox)    Patient Centered Rounds: I have performed bedside rounds with nursing staff today  Discussions with Specialists or Other Care Team Provider:   Education and Discussions with Family / Patient:     Time Spent for Care: 25 mins  More than 50% of total time spent on counseling and coordination of care as described above  Current Length of Stay: 2 day(s)  Current Patient Status: Inpatient   Certification Statement: The patient will continue to require additional inpatient hospital stay due to Closed fracture of right distal fibula    Discharge Plan / Estimated Discharge Date:   Code Status: Level 1 - Full Code  ______________________________________________________________________________    Subjective:   Patient seen and examined  Pain controlled  Hypotensive at times  Noted to be febrile this afternoon    Objective:   Vitals: Blood pressure 111/74, pulse 97, temperature (!) 101 1 °F (38 4 °C), temperature source Temporal, resp  rate 18, height 5' 5" (1 651 m), weight 99 2 kg (218 lb 11 1 oz), SpO2 95 %  Physical Exam:   General appearance: alert, appears stated age and cooperative  Head: Normocephalic, without obvious abnormality, atraumatic  Lungs: clear to auscultation bilaterally  Heart: regular rate and rhythm  Abdomen: soft, non-tender, positive bowel sounds   Back: negative, range of motion normal  Extremities: Right foot casted  Neurologic: Grossly normal    Additional Data:   Labs:    Results from last 7 days  Lab Units 12/03/18  0525 12/02/18  0845   WBC Thousand/uL 5 67 7 86   HEMOGLOBIN g/dL 12 9 12 9   HEMATOCRIT % 40 3 40 4   MCV fL 95 96   PLATELETS Thousands/uL 170 179       Results from last 7 days  Lab Units 12/03/18  0525 12/02/18  0845   SODIUM mmol/L 142 143   POTASSIUM mmol/L 3 3* 3 6   CHLORIDE mmol/L 106 106   CO2 mmol/L 28 33*   ANION GAP mmol/L 8 4   BUN mg/dL 14 18   CREATININE mg/dL 0 71 0 81   CALCIUM mg/dL 8 3 8 2*   ALBUMIN g/dL  --  3 3*   TOTAL BILIRUBIN mg/dL  --  0 35   ALK PHOS U/L  --  102   ALT U/L  --  21   AST U/L  --  13   EGFR ml/min/1 73sq m 95 81   GLUCOSE RANDOM mg/dL 104 105       Results from last 7 days  Lab Units 12/03/18  1942 12/03/18  1938   POC GLUCOSE mg/dl 84 61*             * I Have Reviewed All Lab Data Listed Above          Scheduled Meds:  Current Facility-Administered Medications:  acetaminophen 650 mg Oral Q6H PRN Bobbetta Bumps, DPM    enoxaparin 40 mg Subcutaneous Daily Bobbetta Bumps, DPM    famotidine 20 mg Oral BID Bobbetta Bumps, DPM    ibuprofen 600 mg Oral Q6H PRN Bobbetta Bumps, DPM    levothyroxine 100 mcg Oral Daily Before Breakfast Bobbetta Bumps, DPM    losartan 100 mg Oral Daily Bobbetta Bumps, DPM    magnesium hydroxide 30 mL Oral Daily PRN Bobbetta Bumps, DPM    morphine injection 1 mg Intravenous Q4H PRN Deangelo [place], DPM    naloxone 0 04 mg Intravenous Q1MIN PRN Deangelo [place], DPM    ondansetron 4 mg Intravenous Q6H PRN Frieda Biggs, ABDULAZIZ    oxyCODONE-acetaminophen 1 tablet Oral Q4H PRN Deangelo [place], DPM    oxyCODONE-acetaminophen 2 tablet Oral Q4H PRN Deangelo [place], DPM    sodium chloride 125 mL/hr Intravenous Continuous Lisandra Jose Miguel, DPM Last Rate: 125 mL/hr (12/03/18 2233)   sodium chloride 125 mL/hr Intravenous Continuous Raynold Car, DO Last Rate: 125 mL/hr (12/04/18 1501)   venlafaxine 150 mg Oral Daily ABDULAZIZ Orellana DO  [facility] Internal Medicine  Hospitalist    ** Please Note: This note has been constructed using a voice recognition system   **

## 2018-12-04 NOTE — PLAN OF CARE
Problem: OCCUPATIONAL THERAPY ADULT  Goal: Performs self-care activities at highest level of function for planned discharge setting  See evaluation for individualized goals  Treatment Interventions: ADL retraining, Functional transfer training, UE strengthening/ROM, Endurance training, Patient/family training, Equipment evaluation/education, Compensatory technique education, Activityengagement          See flowsheet documentation for full assessment, interventions and recommendations  Limitation: Decreased ADL status, Decreased UE strength, Decreased Safe judgement during ADL, Decreased endurance, Decreased self-care trans, Decreased high-level ADLs  Prognosis: Good  Assessment: Pt is a 63 y o  female admitted to the hospital 2* fall resulting in closed fracture of right distal fibula  Pt has ORIF of R ankle on 12/3  Pt is NWB on RLE  PTA pt was independent with ADLs, iADLs, and functional mobility--no DME  +falls (=1)  During initial eval pt demonstrated deficits in ADL status, b/l UE strength, functional balance,functional mobility, transfer safety, and activity tolerance (currently=fair 15-20 mins)  Pt would benefir from contniued OT tx for the above deficits  Recommended rehab, pt is agreeable  See goals at end of note        OT Discharge Recommendation: Other (Comment) (Rehab)

## 2018-12-04 NOTE — PHYSICAL THERAPY NOTE
PT EVALUATION    Pt  Name: Sejal King  Pt  Age: 63 y o  MRN: [id number]  LENGTH OF STAY: 2    Patient Active Problem List   Diagnosis    Closed fracture of right distal fibula    Ambulatory dysfunction    Hypertension    Hypothyroidism    Essential hypertension    Depression (emotion)    Pupil asymmetry    RBBB (right bundle branch block)    GERD (gastroesophageal reflux disease)       Admitting Diagnoses: Ankle pain [M25 579]  Closed fracture of right distal fibula [S82 831A]    Past Medical History:   Diagnosis Date    Depression (emotion)     Essential hypertension     Fracture, ankle     Right    GERD (gastroesophageal reflux disease)     Hypothyroidism     Pupil asymmetry        Past Surgical History:   Procedure Laterality Date    CYSTOSCOPY      EYE SURGERY      HIP SURGERY      REPAIR NONUNION / MALUNION METATARSAL / TARSAL BONES      Tarsal tunnel repair       Imaging Studies:  XR ankle 2 vw right   Final Result by Felice Hinkle DO (12/04 8779)      Fluoroscopic guidance provided for surgical procedure  Please refer to the separate procedure notes for additional details  Workstation performed: [id number]         XR ankle 3+ views RIGHT   ED Interpretation by Darrold Shone , DO (12/02 7002)   Spiral fracture of the distal fibula      Final Result by Deena Rose DO (12/02 9908)      Minimally displaced, oblique fracture distal fibula  Mild widening of the medial ankle mortise  Workstation performed: [id number]         XR chest pa & lateral   Final Result by Deena Rose DO (12/02 1239)      No acute cardiopulmonary disease              Workstation performed: [id number]            12/04/18 0910   Note Type   Note type Eval only   Pain Assessment   Pain Score No Pain   Home Living   Type of [address] One level;Stairs to enter with rails  (16STE)   [address];Crutches   Prior Function   Level of [place] Independent with ADLs and functional mobility  (w/o AD)   Lives With Alone   Receives Help From Family   ADL Assistance Independent   Falls in the last 6 months 1 to 4  (1x)   Restrictions/Precautions   Weight Bearing Precautions Per Order Yes   RLE Weight Bearing Per Order NWB   Other Precautions Multiple lines; Fall Risk;WBS   General   Additional Pertinent History h/o L THR, L tarsal tunnel repair   Family/Caregiver Present No   Cognition   Overall Cognitive Status WFL   Arousal/Participation Alert   Orientation Level Oriented X4   Following Commands Follows multistep commands without difficulty   RUE Assessment   RUE Assessment (refer to OT)   LUE Assessment   LUE Assessment (refer to OT)   RLE Assessment   RLE Assessment X   Strength RLE   RLE Overall Strength 4-/5   R Ankle Dorsiflexion (not tested, (+) cast)   R Ankle Plantar Flexion (not tested, (+) cast)   LLE Assessment   LLE Assessment WFL   Strength LLE   LLE Overall Strength 4+/5   Coordination   Movements are Fluid and Coordinated 1   Sensation X  ((+) numbness to R foot)   Bed Mobility   Supine to Sit Unable to assess  (refer to OT)   Additional Comments pt sitting at EOB upon my arrival   Transfers   Sit to Stand 4  Minimal assistance   Additional items Assist x 1; Increased time required;Verbal cues   Stand to Sit 4  Minimal assistance   Additional items Assist x 1; Increased time required;Verbal cues;Armrests   Additional Comments cues for techniques & to maintain NWB to R foot; pt tend to toe or heel touch during transitions   Ambulation/Elevation   Gait pattern Decreased foot clearance; Short stride; Step to;Excessively slow  (hop to gait pattern)   Gait Assistance 4  Minimal assist   Additional items Assist x 1;Verbal cues; Tactile cues  (pt tend to toe or heel touch during amb)   Assistive Device Rolling walker   Distance 10'x1   Balance   Static Sitting Good   Static Standing Fair -   Ambulatory Poor +   Activity Tolerance   Activity Tolerance Patient limited by fatigue   Nurse Made Aware Radha   Assessment   Prognosis Good   Problem List Decreased strength;Decreased endurance; Impaired balance;Decreased mobility; Impaired sensation;Obesity;Orthopedic restrictions;Pain   Assessment Pt  63 y  o female admitted for Closed fracture of right distal fibula S/p Right ankle ORIF with popliteal block on 12/3/18  Pt referred to PT for mobility assessment & D/C planning w/ orders of up & OOB as tolerated w/ NWB to RLE  PTA, pt reports being I w/o AD; lives alone on the 2nd floor apartment w/ 16STE  On eval, pt demonstrate dec mobility, balance, endurance & amb 2* to WB restriction  Pt require minAx1 for most functional mobility + cues for techniques  Gait deviations as above, slow hop to pattern but no gross LOB noted  Dec amb tolerance 2* to fatigue  Of note, pt has h/o L THR & tarsal tunnel repair on LLE hence long distance hopping to LLE may not be suitable  Pt may need w/c for community distances  Pt require cues to maintain NWB to RLE as pt tends to toe or heel touch during sit<>stand transitions & during amb  No dizziness reported t/o session  Nsg staff most recent vital signs as follows: /82 (BP Location: Left arm)   Pulse 80   Temp 98 4 °F (36 9 °C) (Temporal)   Resp 18   Ht 5' 5" (1 651 m)   Wt 99 2 kg (218 lb 11 1 oz)   SpO2 95%   BMI 36 39 kg/m²   At end of session, pt tolerated OOB in chair w/o issues, call bell & phone in reach  Fall precautions reinforced w/ good understanding  Pt functioning below baseline hence will continue skilled PT to improve function & safety  At this time, due to above mentioned deficits, home alone, inaccessible home & high risk for falls, pt will benefit from inpt rehab at D/C  Pt agreeable to D/C rec  CM to follow  Ns staff to continue to mobilized pt (OOB in chair for all meals & ambulate in room/unit) as tolerated to prevent further decline in function  Nsg notified      Barriers to Discharge Inaccessible home environment;Decreased caregiver support   Barriers to Discharge Comments pt home alone + 16STE   Goals   Patient Goals get better   STG Expiration Date 12/11/18   Short Term Goal #1 Goals to be met in 7 days; pt will be able to: 1) inc strength & balance by 1/2 grade to improve overall functional mobility & dec fall risk; 2) inc bed mobility to I for pt to be able to get in/OOB safely w/ proper techniques 100% of the time, to dec caregiver assistance & safely function at home; 3) inc transfers to S for pt to transition safely from one surface to another w/o % of the time, to dec caregiver assistance & safely function at home; 4) inc amb w/ RW approx  48' w/ S for pt to ambulate as tolerated w/o any % of the time, to dec caregiver assistance & safely function at home; 5) inc barthel score to 75 to decrease overall risk for falls; 6) negotiate stairs w/ S w/ appropriate method (most likely seated method) for inc safety during stair mgt inside/outside of home & dec caregiver assistance; 7) pt/caregiver ed   Treatment Day 0   Plan   Treatment/Interventions Functional transfer training;LE strengthening/ROM; Elevations; Therapeutic exercise; Endurance training;Patient/family training;Bed mobility;Gait training;Spoke to nursing;OT   PT Frequency Other (Comment)  (5-6x/wk)   Recommendation   Recommendation Other (Comment)  (inpt rehab)   Equipment Recommended Wheelchair;Walker   PT - OK to Discharge Yes  (to inpt rehab when medically cleared)   Barthel Index   Feeding 10   Bathing 0   Grooming Score 5   Dressing Score 5   Bladder Score 10   Bowels Score 10   Toilet Use Score 5   Transfers (Bed/Chair) Score 10   Mobility (Level Surface) Score 0   Stairs Score 0   Barthel Index Score 55   Hx/personal factors: co-morbidities, inaccessible home, home alone, mutliple lines, use of AD, pain, WB restrictions, h/o of falls, fall risk and obesity  Examination: dec mobility, dec balance, dec endurance, dec amb, moderate fall risk, pain, WB restrictions  Clinical: unpredictable (ongoing medical status, abnormal lab values, moderate fall risk and pain mgt)  Complexity: high     Karthik Conroy, PT

## 2018-12-04 NOTE — PLAN OF CARE
Problem: PHYSICAL THERAPY ADULT  Goal: Performs mobility at highest level of function for planned discharge setting  See evaluation for individualized goals  Treatment/Interventions: Functional transfer training, LE strengthening/ROM, Elevations, Therapeutic exercise, Endurance training, Patient/family training, Bed mobility, Gait training, Spoke to nursing, OT  Equipment Recommended: Wheelchair, Walker       See flowsheet documentation for full assessment, interventions and recommendations  Outcome: Progressing  Prognosis: Good  Problem List: Decreased strength, Decreased endurance, Impaired balance, Decreased mobility, Impaired sensation, Obesity, Orthopedic restrictions, Pain  Assessment: Pt  63 y  o female admitted for Closed fracture of right distal fibula S/p Right ankle ORIF with popliteal block on 12/3/18  Pt referred to PT for mobility assessment & D/C planning w/ orders of up & OOB as tolerated w/ NWB to RLE  PTA, pt reports being I w/o AD; lives alone on the 2nd floor apartment w/ 16STE  On eval, pt demonstrate dec mobility, balance, endurance & amb 2* to WB restriction  Pt require minAx1 for most functional mobility + cues for techniques  Gait deviations as above, slow hop to pattern but no gross LOB noted  Dec amb tolerance 2* to fatigue  Of note, pt has h/o L THR & tarsal tunnel repair on LLE hence long distance hopping to LLE may not be suitable  Pt may need w/c for community distances  Pt require cues to maintain NWB to RLE as pt tends to toe or heel touch during sit<>stand transitions & during amb  No dizziness reported t/o session  Nsg staff most recent vital signs as follows: /82 (BP Location: Left arm)   Pulse 80   Temp 98 4 °F (36 9 °C) (Temporal)   Resp 18   Ht 5' 5" (1 651 m)   Wt 99 2 kg (218 lb 11 1 oz)   SpO2 95%   BMI 36 39 kg/m²   At end of session, pt tolerated OOB in chair w/o issues, call bell & phone in reach  Fall precautions reinforced w/ good understanding   Pt functioning below baseline hence will continue skilled PT to improve function & safety  At this time, due to above mentioned deficits, home alone, inaccessible home & high risk for falls, pt will benefit from inpt rehab at D/C  Pt agreeable to D/C rec  CM to follow  Nsg staff to continue to mobilized pt (OOB in chair for all meals & ambulate in room/unit) as tolerated to prevent further decline in function  Nsg notified  Barriers to Discharge: Inaccessible home environment, Decreased caregiver support  Barriers to Discharge Comments: pt home alone + 16STE  Recommendation: Other (Comment) (inpt rehab)     PT - OK to Discharge: Yes (to inpt rehab when medically cleared)    See flowsheet documentation for full assessment

## 2018-12-04 NOTE — OCCUPATIONAL THERAPY NOTE
OccupationalTherapy Evaluation(time=0840-0911)     Patient Name: Andrés Colon  Today's Date: 12/4/2018  Problem List  Patient Active Problem List   Diagnosis    Closed fracture of right distal fibula    Ambulatory dysfunction    Hypertension    Hypothyroidism    Essential hypertension    Depression (emotion)    Pupil asymmetry    RBBB (right bundle branch block)    GERD (gastroesophageal reflux disease)     Past Medical History  Past Medical History:   Diagnosis Date    Depression (emotion)     Essential hypertension     Fracture, ankle     Right    GERD (gastroesophageal reflux disease)     Hypothyroidism     Pupil asymmetry      Past Surgical History  Past Surgical History:   Procedure Laterality Date    CYSTOSCOPY      EYE SURGERY      HIP SURGERY      ORIF TIBIA & FIBULA FRACTURES Right 12/3/2018    Procedure: OPEN REDUCTION W/ INTERNAL FIXATION (ORIF) ANKLE;  Surgeon: Ry Armijo DPM;  Location: [facility];  Service: Podiatry    REPAIR NONUNION / MALUNION METATARSAL / TARSAL BONES      Tarsal tunnel repair           12/04/18 0911   Note Type   Note type Eval only   Restrictions/Precautions   Weight Bearing Precautions Per Order Yes   RLE Weight Bearing Per Order NWB   Other Precautions WBS; Fall Risk;Multiple lines   Pain Assessment   Pain Assessment No/denies pain   Pain Score No Pain   Home Living   Type of [address] [facility] One level;Stairs to enter with rails  ([address])   [address] [facility] Walker;Crutches   Prior Function   Level of [place] Independent with ADLs and functional mobility   Lives With Alone   Receives Help From Family   ADL Assistance Independent   IADLs Independent   Falls in the last 6 months 1 to 4  (1)   Vocational Retired   Lifestyle   Autonomy PTA pt was independent with ADLs, iADLs, and functional mobility--no DME  +falls (=1)   Reciprocal Relationships 1 son   Service to Others Worked as a     Intrinsic Gratification Being with grandchildren    Psychosocial   Psychosocial (WDL) X   Patient Behaviors/Mood Tearful; Anxious   Subjective   Subjective "My toes are numb"    ADL   Where Assessed Edge of bed   Eating Assistance 5  Supervision/Setup   Grooming Assistance 5  Supervision/Setup   UB Bathing Assistance 5  Supervision/Setup    [address] 5  Supervision/Setup   LB Dressing Assistance 4  Minimal Assistance   Bed Mobility   Rolling L 5  Supervision   Additional items Bedrails;HOB elevated   Supine to Sit 5  Supervision   Additional items HOB elevated; Bedrails   Additional Comments Pt OOB in chair at end of eval with all needs in reach, nsg notified   Transfers   Sit to Stand 4  Minimal assistance   Additional items Assist x 1; Increased time required;Verbal cues   Stand to Sit 4  Minimal assistance   Additional items Assist x 1; Increased time required;Verbal cues   Functional Mobility   Functional Mobility 4  Minimal assistance  (x1)   Additional items Rolling walker   Balance   Static Sitting Good   Dynamic Sitting Fair +   Static Standing Fair -   Dynamic Standing Poor +   Activity Tolerance   Activity Tolerance Patient limited by fatigue   Medical Staff Made Aware nsg, P T     RUE Assessment   RUE Assessment WFL   RUE Strength   RUE Overall Strength Within Functional Limits - able to perform ADL tasks with strength  (4/5 throughout)   LUE Assessment   LUE Assessment WFL   LUE Strength   LUE Overall Strength Within Functional Limits - able to perform ADL tasks with strength  (4/5 throughout)   Hand Function   Gross Motor Coordination Functional   Fine Motor Coordination Functional   Sensation   Light Touch No apparent deficits   Proprioception   Proprioception No apparent deficits   Vision-Basic Assessment   Current Vision Wears glasses all the time   Visual History Cataracts; Other (Comment)  (Hx of double vision )   Vision - Complex Assessment   Acuity Able to read clock/calendar on wall without difficulty   Perception   Inattention/Neglect Appears intact   Cognition   Overall Cognitive Status [facility]   Arousal/Participation Alert; Responsive; Cooperative   Attention Within functional limits   Orientation Level Oriented X4   Memory Within functional limits   Following Commands Follows multistep commands without difficulty   Assessment   Limitation Decreased ADL status; Decreased UE strength;Decreased Safe judgement during ADL;Decreased endurance;Decreased self-care trans;Decreased high-level ADLs   Prognosis Good   Assessment Pt is a 63 y o  female admitted to the hospital 2* fall resulting in closed fracture of right distal fibula  Pt has ORIF of R ankle on 12/3  Pt is NWB on RLE  PTA pt was independent with ADLs, iADLs, and functional mobility--no DME  +falls (=1)  During initial eval pt demonstrated deficits in ADL status, b/l UE strength, functional balance,functional mobility, transfer safety, and activity tolerance (currently=fair 15-20 mins)  Pt would benefir from contniued OT tx for the above deficits  Recommended rehab, pt is agreeable  See goals at end of note  Goals   Patient Goals To get better   Plan   Treatment Interventions ADL retraining;Functional transfer training;UE strengthening/ROM; Endurance training;Patient/family training;Equipment evaluation/education; Compensatory technique education; Activityengagement   Goal Expiration Date 12/14/18   Treatment Day 0   OT Frequency 3-5x/wk   Recommendation   OT Discharge Recommendation Other (Comment)  (Rehab)   Barthel Index   Feeding 10   Bathing 0   Grooming Score 5   Dressing Score 5   Bladder Score 10   Bowels Score 10   Toilet Use Score 5   Transfers (Bed/Chair) Score 10   Mobility (Level Surface) Score 0   Stairs Score 0   Barthel Index Score 55   Modified [place] Scale   Modified [place] Scale 4     Occupational Therapy Goals (7-10 days): 1  Pt will demonstrate mod I with walker mobility for home/community activities 100% of the time while complying with NWB status  2  Pt will independently verbalize 2-3 potential fall risks/transfer safety hazards and their appropriate compensation techniques  3  Pt will demonstrate proper transfer safety 100% of the time while complying with NWB status  4  Pt will demonstrate mod I with sit-stand transfers while complying with NWB status to assist with completion of their LE ADLs  5  Pt will demonstrate improved activity tolerance to good (20-30 mins) and standing tolerance to 5-7 mins to assist with ADL tasks  6  Pt will demonstrate an increase in b/i UE strength by 1/2 MM grade for participation in ADL completion  7  Pt will demonstrate g balance 100% of the time  8  Pt will demonstrate mod I with UE and LE bathing/dressing while complying with NWB status  9  Pt will demonstrate g carryover of pt education/training w/ no verbal cueing 100% of the time  10  Pt will demonstrate compliance with NWB status during ADL and functional mobility tasks 100% of the time       Dennis Costa

## 2018-12-04 NOTE — ANESTHESIA POSTPROCEDURE EVALUATION
Post-Op Assessment Note      CV Status:  Stable    Mental Status:  Alert and awake    Hydration Status:  Euvolemic    PONV Controlled:  Controlled    Airway Patency:  Patent    Post Op Vitals Reviewed: Yes          Staff: Anesthesiologist           /60 (12/03/18 2145)    Temp (!) 97 4 °F (36 3 °C) (12/03/18 2145)    Pulse 71 (12/03/18 2145)   Resp 12 (12/03/18 2145)    SpO2 95 % (12/03/18 2145)

## 2018-12-05 LAB
ALBUMIN SERPL BCP-MCNC: 2.7 G/DL (ref 3.5–5)
ALP SERPL-CCNC: 94 U/L (ref 46–116)
ALT SERPL W P-5'-P-CCNC: 31 U/L (ref 12–78)
ANION GAP SERPL CALCULATED.3IONS-SCNC: 7 MMOL/L (ref 4–13)
AST SERPL W P-5'-P-CCNC: 23 U/L (ref 5–45)
BILIRUB SERPL-MCNC: 0.34 MG/DL (ref 0.2–1)
BUN SERPL-MCNC: 14 MG/DL (ref 5–25)
CALCIUM SERPL-MCNC: 7.9 MG/DL (ref 8.3–10.1)
CHLORIDE SERPL-SCNC: 107 MMOL/L (ref 100–108)
CO2 SERPL-SCNC: 29 MMOL/L (ref 21–32)
CREAT SERPL-MCNC: 0.71 MG/DL (ref 0.6–1.3)
ERYTHROCYTE [DISTWIDTH] IN BLOOD BY AUTOMATED COUNT: 13.5 % (ref 11.6–15.1)
GFR SERPL CREATININE-BSD FRML MDRD: 95 ML/MIN/1.73SQ M
GLUCOSE SERPL-MCNC: 103 MG/DL (ref 65–140)
HCT VFR BLD AUTO: 34.8 % (ref 34.8–46.1)
HGB BLD-MCNC: 11.2 G/DL (ref 11.5–15.4)
MCH RBC QN AUTO: 31.4 PG (ref 26.8–34.3)
MCHC RBC AUTO-ENTMCNC: 32.2 G/DL (ref 31.4–37.4)
MCV RBC AUTO: 98 FL (ref 82–98)
PLATELET # BLD AUTO: 161 THOUSANDS/UL (ref 149–390)
PMV BLD AUTO: 9.9 FL (ref 8.9–12.7)
POTASSIUM SERPL-SCNC: 3.5 MMOL/L (ref 3.5–5.3)
PROT SERPL-MCNC: 5.6 G/DL (ref 6.4–8.2)
RBC # BLD AUTO: 3.57 MILLION/UL (ref 3.81–5.12)
SODIUM SERPL-SCNC: 143 MMOL/L (ref 136–145)
TSH SERPL DL<=0.05 MIU/L-ACNC: 1.83 UIU/ML (ref 0.36–3.74)
WBC # BLD AUTO: 5.78 THOUSAND/UL (ref 4.31–10.16)

## 2018-12-05 PROCEDURE — 85027 COMPLETE CBC AUTOMATED: CPT | Performed by: INTERNAL MEDICINE

## 2018-12-05 PROCEDURE — 80053 COMPREHEN METABOLIC PANEL: CPT | Performed by: INTERNAL MEDICINE

## 2018-12-05 PROCEDURE — 84443 ASSAY THYROID STIM HORMONE: CPT | Performed by: INTERNAL MEDICINE

## 2018-12-05 PROCEDURE — 99232 SBSQ HOSP IP/OBS MODERATE 35: CPT | Performed by: INTERNAL MEDICINE

## 2018-12-05 RX ORDER — OXYCODONE HYDROCHLORIDE AND ACETAMINOPHEN 5; 325 MG/1; MG/1
1 TABLET ORAL EVERY 4 HOURS PRN
Qty: 35 TABLET | Refills: 0 | Status: SHIPPED | OUTPATIENT
Start: 2018-12-05 | End: 2018-12-15

## 2018-12-05 RX ADMIN — ENOXAPARIN SODIUM 40 MG: 40 INJECTION SUBCUTANEOUS at 09:27

## 2018-12-05 RX ADMIN — VENLAFAXINE HYDROCHLORIDE 150 MG: 150 CAPSULE, EXTENDED RELEASE ORAL at 09:27

## 2018-12-05 RX ADMIN — ACETAMINOPHEN 650 MG: 325 TABLET ORAL at 23:57

## 2018-12-05 RX ADMIN — ACETAMINOPHEN 650 MG: 325 TABLET ORAL at 15:29

## 2018-12-05 RX ADMIN — LOSARTAN POTASSIUM 100 MG: 50 TABLET, FILM COATED ORAL at 09:28

## 2018-12-05 RX ADMIN — OXYCODONE HYDROCHLORIDE AND ACETAMINOPHEN 2 TABLET: 5; 325 TABLET ORAL at 09:32

## 2018-12-05 RX ADMIN — LEVOTHYROXINE SODIUM 100 MCG: 100 TABLET ORAL at 06:16

## 2018-12-05 RX ADMIN — OXYCODONE HYDROCHLORIDE AND ACETAMINOPHEN 1 TABLET: 5; 325 TABLET ORAL at 00:24

## 2018-12-05 RX ADMIN — FAMOTIDINE 20 MG: 20 TABLET ORAL at 09:28

## 2018-12-05 RX ADMIN — FAMOTIDINE 20 MG: 20 TABLET ORAL at 18:25

## 2018-12-05 NOTE — PLAN OF CARE
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
## 2018-12-05 NOTE — UTILIZATION REVIEW
Continued Stay Review    Date/POD#:      FOR   12/3/2018   OP  DAY    Vital Signs: /76 (BP Location: Right arm)   Pulse 70   Temp 98 °F (36 7 °C) (Tympanic)   Resp 20   Ht 5' 5" (1 651 m)   Wt 99 2 kg (218 lb 11 1 oz)   SpO2 96%   BMI 36 39 kg/m²      Temp max  101 1   ( 12/4  @   1530)    Medication:   Scheduled Meds:   Current Facility-Administered Medications:  acetaminophen 650 mg Oral Q6H PRN Frieda Dian, DPM   enoxaparin 40 mg Subcutaneous Daily Frieda Dian, DPM   famotidine 20 mg Oral BID Frieda Dian, DPM   ibuprofen 600 mg Oral Q6H PRN Frieda Dian, DPM   levothyroxine 100 mcg Oral Daily Before Breakfast Frieda Dian, DPM   losartan 100 mg Oral Daily Frieda Dian, DPM   magnesium hydroxide 30 mL Oral Daily PRN Frieda Dian, DPM   morphine injection 1 mg Intravenous Q4H PRN Deangelo [place], DPM   naloxone 0 04 mg Intravenous Q1MIN PRN Deangelo [place], DPM   ondansetron 4 mg Intravenous Q6H PRN Frieda Dian, DPM   oxyCODONE-acetaminophen 1 tablet Oral Q4H PRN Deangelo [place], DPM   oxyCODONE-acetaminophen 2 tablet Oral Q4H PRN Deangelo [place], DPM   venlafaxine 150 mg Oral Daily Frieda Dian, DPM     Continuous Infusions:    PRN Meds:   acetaminophen    ibuprofen    magnesium hydroxide    morphine injection    naloxone    ondansetron    oxyCODONE-acetaminophen    oxyCODONE-acetaminophen     No IV  Pain meds  12/3    Abnormal Labs/Diagnostic Results:    No labs  12/4  K  3 3   (  12/3)    Age/Sex: 63 y o  female     Assessment/Plan: SURGERY DATE: 12/3/2018    2000     Surgeon(s) and Role:     * Lisandra Gillespie DPM - Primary     * Deangelo Doan DPM - Assisting- 1st assist     * Frieda Biggs DPM - Assisting- 2nd assist     Preop Diagnosis:  Closed fracture of right distal fibula [S82 831A]     Post-Op Diagnosis Codes:     * Closed fracture of right distal fibula [S82 831A]     Procedure(s) (LRB):  OPEN REDUCTION W/ INTERNAL FIXATION (ORIF) ANKLE (Right)    POST OP PROGRESS  NOTE     12/4  Postop check  Patient resting comfortably in bed  She is concerned because she cannot feel or move her toes on the right foot     O: CRT digits brisk, I am able to reach under cast and feel DP pulse  No current sensation to RLE, no pain at knee or thigh     A: POD #1 Right ankle ORIF with popliteal block     Plan:  1  Postop Xrays show good anatomic reduction with hardware  NWB RLE  2  PT to see today, DC planning, will need lovenox for 30 days, recommend STR as she has fall history and numerous home issues with stairs and independence  3  Popliteal block should wear off later today/tonight  I tried to reassure her that this was normal following the block, She is no current pain    Discharge Plan:    [address] Utilization Review Department  Phone: [phone]; Fax [phone]  [email or web address]  ATTENTION: Please call with any questions or concerns to [phone]  and carefully listen to the prompts so that you are directed to the right person  Send all requests for admission clinical reviews, approved or denied determinations and any other requests to fax [phone]   All voicemails are confidential

## 2018-12-05 NOTE — PLAN OF CARE
Problem: Potential for Falls  Goal: Patient will remain free of falls  INTERVENTIONS:  - Assess patient frequently for physical needs  -  Identify cognitive and physical deficits and behaviors that affect risk of falls    -  [place] fall precautions as indicated by assessment   - Educate patient/family on patient safety including physical limitations  - Instruct patient to call for assistance with activity based on assessment  - Modify environment to reduce risk of injury  - Consider OT/PT consult to assist with strengthening/mobility   Outcome: Progressing

## 2018-12-05 NOTE — PROGRESS NOTES
Progress Note - [address] 63 y o  female MRN: [id number]  Unit/Bed#: E2 -01 Encounter: [id number]    Assessment:  3  62 y/o female with POD#2 s/p Right ankle ORIF by Dr Verónica Quinones   2  Ambulatory Dysfunction  3  Hypertension  4  Hypothyroidism  5  Atelectasis     Plan:   -afebrile with VSS, no leukocytosis, non toxic in appearance   -PT/OT recommends discharge to rehab, [address] [place] [address] from Case management to speak to the patient and provide list of rehabilitation facilities   -okay to be discharged from SLIM standpoint   -NWB to the RLE   -c/w incentive spirometry   -lovenox for DVT ppx post operatively for 30 days   -right lower extremity dressings and cast intact, ROM and sensation intact to the digits       Subjective/Objective   Chief Complaint:   Chief Complaint   Patient presents with    Ankle Pain     Patient presents following mechanical fall, complaining of right lateral ankle pain  Subjective: 63 y o  y/o female was seen and evaluated at bedside  NAD  Pain under control with pain medications  Blood pressure 111/76, pulse 70, temperature 98 °F (36 7 °C), temperature source Tympanic, resp  rate 20, height 5' 5" (1 651 m), weight 99 2 kg (218 lb 11 1 oz), SpO2 96 %  ,Body mass index is 36 39 kg/m²  Invasive Devices     Peripheral Intravenous Line            Peripheral IV 12/02/18 Right Hand 2 days                Physical Exam:   General: Alert, cooperative and no distress  Lungs: Non labored breathing  Heart: Positive S1, S2  Abdomen: Soft, non-tender  Extremity:   Right lower extremity:   DP palpable  ROM and sensation intact to the digits  Dressings and cast intact  No pain to the calf  Lab, Imaging and other studies:   I have personally reviewed pertinent lab results    , CBC:   Lab Results   Component Value Date    WBC 5 78 12/05/2018    HGB 11 2 (L) 12/05/2018    HCT 34 8 12/05/2018    MCV 98 12/05/2018     12/05/2018    MCH 31 4 12/05/2018    MCHC 32 2 12/05/2018    RDW 13 5 12/05/2018    MPV 9 9 12/05/2018   , CMP:   Lab Results   Component Value Date    SODIUM 143 12/05/2018    K 3 5 12/05/2018     12/05/2018    CO2 29 12/05/2018    BUN 14 12/05/2018    CREATININE 0 71 12/05/2018    CALCIUM 7 9 (L) 12/05/2018    AST 23 12/05/2018    ALT 31 12/05/2018    ALKPHOS 94 12/05/2018    EGFR 95 12/05/2018       Imaging: I have personally reviewed pertinent films in PACS  EKG, Pathology, and Other Studies: I have personally reviewed pertinent reports    VTE Pharmacologic Prophylaxis: Enoxaparin (Lovenox)

## 2018-12-05 NOTE — ASSESSMENT & PLAN NOTE
Hypothyroidism  Continue levothyroxine, patient requesting check a TSH as has not been done recently    Added on labs

## 2018-12-05 NOTE — ASSESSMENT & PLAN NOTE
Postoperative fever likely atelectasis  No evidence of acute infection    Added incentive spirometer without further fevers

## 2018-12-05 NOTE — DISCHARGE INSTRUCTIONS
Podiatry instructions:   1  Please leave dressings dry, clean and intact to your right leg   2  Please follow up with Dr Vero Santacruz within 1 week of discharge   3  Please administer lovenox for DVT prophylaxis   4  Please remain non weight bearing to your right leg at all times       Leg Fracture   WHAT YOU NEED TO KNOW:   A leg fracture is a break in any of the 3 long bones of your leg  The femur is the largest bone and goes from your hip to your knee  The fibula and tibia are the 2 bones in your lower leg that go from your knee to your ankle  DISCHARGE INSTRUCTIONS:   Return to the emergency department if:   · Your leg feels warm, tender, and painful  It may look swollen and red  · The pain in your injured leg gets worse even after you rest and take medicine  · Your cast gets wet or damaged  · Your leg or toes are numb  · The skin or toes of your injured leg become swollen, cold, or blue  Contact your healthcare provider or bone specialist if:   · You have a fever  · Your cast or brace is too tight  · There are new blood stains or a bad smell coming from under the cast     · You have new or worsening trouble moving your leg  · You have questions or concerns about your condition or care  Medicines:   · Prescription pain medicine  may be given  Ask how to take this medicine safely  · NSAIDs , such as ibuprofen, help decrease swelling, pain, and fever  NSAIDs can cause stomach bleeding or kidney problems in certain people  If you take blood thinner medicine, always ask your healthcare provider if NSAIDs are safe for you  Always read the medicine label and follow directions  · Acetaminophen  decreases pain and fever  It is available without a doctor's order  Ask how much to take and how often to take it  Follow directions   Read the labels of all other medicines you are using to see if they also contain acetaminophen, or ask your doctor or pharmacist  Acetaminophen can cause liver damage if not taken correctly  Do not use more than 4 grams (4,000 milligrams) total of acetaminophen in one day  · Take your medicine as directed  Contact your healthcare provider if you think your medicine is not helping or if you have side effects  Tell him of her if you are allergic to any medicine  Keep a list of the medicines, vitamins, and herbs you take  Include the amounts, and when and why you take them  Bring the list or the pill bottles to follow-up visits  Carry your medicine list with you in case of an emergency  Cast or splint care:   · Check the skin around your cast and splint daily for any redness or open areas  · Do not use a sharp or pointed object to scratch your skin under the cast or splint  · Do not remove your splint unless your healthcare provider says it is okay  Bathing with a cast or splint:  Do not let your cast or splint get wet  Before bathing, cover the cast or splint with a plastic bag  Tape the bag to your skin above the cast or splint to seal out the water  Keep your leg out of the water in case the bag leaks  Ask when it is okay to take a bath or shower  Self-care:   · Rest  your leg as directed and avoid activities that cause leg pain  · Apply ice  on your leg for 15 to 20 minutes every hour or as directed  Use an ice pack, or put crushed ice in a plastic bag  Cover it with a towel  Ice helps prevent tissue damage and decreases swelling and pain  · Elevate  your leg above the level of your heart as often as you can  This will help decrease swelling and pain  Prop your leg on pillows or blankets to keep it elevated comfortably  · Use crutches or a walker  as directed  Crutches will help you walk and take some weight off your injured leg while it heals  · Physical therapy  may be recommended  A physical therapist teaches you exercises to help improve movement and strength, and to decrease pain    Follow up with your healthcare provider or bone specialist as directed: You may need to return to have your splint or cast removed  You may need an x-ray of your leg to check how well the bone has healed  Write down your questions so you remember to ask them during your visits  © 2017 [employer] Information is for End User's use only and may not be sold, redistributed or otherwise used for commercial purposes  All illustrations and images included in CareNotes® are the copyrighted property of [employer]  or [employer]  The above information is an  only  It is not intended as medical advice for individual conditions or treatments  Talk to your doctor, nurse or pharmacist before following any medical regimen to see if it is safe and effective for you  Leg Fracture   WHAT YOU NEED TO KNOW:   A leg fracture is a break in any of the 3 long bones of your leg  The femur is the largest bone and goes from your hip to your knee  The fibula and tibia are the 2 bones in your lower leg that go from your knee to your ankle  DISCHARGE INSTRUCTIONS:   Seek care immediately if:   · Your leg feels warm, tender, and painful  It may look swollen and red  · The pain in your injured leg gets worse even after you rest and take medicine  · Your cast gets wet or damaged  · Your leg or toes are numb  · The skin or toes of your injured leg become swollen, cold, or blue  Contact your healthcare provider or bone specialist if:   · You have a fever  · Your cast or brace is too tight  · There are new blood stains or a bad smell coming from under the cast     · You have new or worsening trouble moving your leg  · You have questions or concerns about your condition or care  Medicines:   · Prescription pain medicine  may be given  Ask how to take this medicine safely  · NSAIDs , such as ibuprofen, help decrease swelling, pain, and fever  NSAIDs can cause stomach bleeding or kidney problems in certain people  If you take blood thinner medicine, always ask your healthcare provider if NSAIDs are safe for you  Always read the medicine label and follow directions  · Acetaminophen  decreases pain and fever  It is available without a doctor's order  Ask how much to take and how often to take it  Follow directions  Read the labels of all other medicines you are using to see if they also contain acetaminophen, or ask your doctor or pharmacist  Acetaminophen can cause liver damage if not taken correctly  Do not use more than 4 grams (4,000 milligrams) total of acetaminophen in one day  · Take your medicine as directed  Contact your healthcare provider if you think your medicine is not helping or if you have side effects  Tell him of her if you are allergic to any medicine  Keep a list of the medicines, vitamins, and herbs you take  Include the amounts, and when and why you take them  Bring the list or the pill bottles to follow-up visits  Carry your medicine list with you in case of an emergency  Cast or splint care:   · Check the skin around your cast and splint daily for any redness or open areas  · Do not use a sharp or pointed object to scratch your skin under the cast or splint  · Do not remove your splint unless your healthcare provider says it is okay  Bathing with a cast or splint:  Do not let your cast or splint get wet  Before bathing, cover the cast or splint with a plastic bag  Tape the bag to your skin above the cast or splint to seal out the water  Keep your leg out of the water in case the bag leaks  Ask when it is okay to take a bath or shower  Self-care:   · Rest  your leg as directed and avoid activities that cause leg pain  · Apply ice  on your leg for 15 to 20 minutes every hour or as directed  Use an ice pack, or put crushed ice in a plastic bag  Cover it with a towel  Ice helps prevent tissue damage and decreases swelling and pain      · Elevate  your leg above the level of your heart as often as you can  This will help decrease swelling and pain  Prop your leg on pillows or blankets to keep it elevated comfortably  · Use crutches or a walker  as directed  Crutches will help you walk and take some weight off your injured leg while it heals  · Physical therapy  may be recommended  A physical therapist teaches you exercises to help improve movement and strength, and to decrease pain  Follow up with your healthcare provider or bone specialist as directed: You may need to return to have your splint or cast removed  You may need an x-ray of your leg to check how well the bone has healed  Write down your questions so you remember to ask them during your visits  © 2017 [employer] Information is for End User's use only and may not be sold, redistributed or otherwise used for commercial purposes  All illustrations and images included in CareNotes® are the copyrighted property of [employer]  or [employer] Padilla  The above information is an  only  It is not intended as medical advice for individual conditions or treatments  Talk to your doctor, nurse or pharmacist before following any medical regimen to see if it is safe and effective for you  Leg Fracture   WHAT YOU NEED TO KNOW:   What is a leg fracture? A leg fracture is a break in any of the 3 long bones of your leg  The femur is the largest bone and goes from your hip to your knee  The fibula and tibia are the 2 bones in your lower leg that go from your knee to your ankle  What causes a leg fracture? A leg fracture is often caused by an injury  Car and sports accidents are common causes of leg fractures  Stress fractures can occur from repetitive use or overuse  Stress fractures are tiny cracks that form in long bones, such as your tibia  Osteoporosis (brittle bones) can increase your risk for a leg fracture if you fall  What are the signs and symptoms of a leg fracture?    · Pain that worsens when you stand on or move your injured leg    · Trouble moving your leg    · Abnormal leg position or shape    · Swelling or bruising    · Weakness or loss of feeling in your leg  How is a leg fracture diagnosed? Your healthcare provider will ask how you injured your leg  He or she will examine your leg  He or she may touch areas of your leg to see if you have decreased feeling  The provider will also check for any open breaks in the skin  You may an x-ray, ultrasound, CT, or MRI  You may be given contrast liquid to help the fracture show up better in the pictures  Tell the healthcare provider if you have ever had an allergic reaction to contrast liquid  Do not enter the MRI room with anything metal  Metal can cause serious injury  Tell the healthcare provider if you have any metal in or on your body  How is a leg fracture treated? Treatment depends on what kind of fracture you have and how bad it is  You may need any of the following:  · A brace, cast, or splint  may be placed on your leg to decrease your leg movement and hold the broken bones in place  These devices may help decrease pain and prevent further bone damage  · Prescription pain medicine  may be given  Ask how to take this medicine safely  · NSAIDs , such as ibuprofen, help decrease swelling, pain, and fever  NSAIDs can cause stomach bleeding or kidney problems in certain people  If you take blood thinner medicine, always ask your healthcare provider if NSAIDs are safe for you  Always read the medicine label and follow directions  · Acetaminophen  decreases pain and fever  It is available without a doctor's order  Ask how much to take and how often to take it  Follow directions  Read the labels of all other medicines you are using to see if they also contain acetaminophen, or ask your doctor or pharmacist  Acetaminophen can cause liver damage if not taken correctly   Do not use more than 4 grams (4,000 milligrams) total of acetaminophen in one day  · External fixation  is surgery to put screws through your skin and into your broken bones  The screws will be secured to a device that is placed outside of your leg  External fixation holds your bones together so they can heal  You may still need open surgery on your leg to fix injured areas after the external fixation is removed  · Open reduction and internal fixation  may be done to put your bones back into the correct position  This may include the use of special wires, pins, plates or screws  These help keep the broken pieces lined up so your leg can heal correctly  · Traction  may be needed if your bone broke into 2 pieces  Traction pulls on the bones to put them back into place  A pin may be put in your bone or cast and hooked to the traction device  Weights are hung from the traction device to help pull the bones into the right position  What can I do to help my leg fracture heal?   · Rest  your leg as directed and avoid activities that cause leg pain  · Apply ice  on your leg for 15 to 20 minutes every hour or as directed  Use an ice pack, or put crushed ice in a plastic bag  Cover it with a towel  Ice helps prevent tissue damage and decreases swelling and pain  · Elevate  your leg above the level of your heart as often as you can  This will help decrease swelling and pain  Prop your leg on pillows or blankets to keep it elevated comfortably  · Use crutches or a walker  as directed  Crutches will help you walk and take some weight off your injured leg while it heals  · Physical therapy  may be recommended  A physical therapist teaches you exercises to help improve movement and strength, and to decrease pain  When should I seek immediate care? · Your leg feels warm, tender, and painful  It may look swollen and red  · The pain in your injured leg gets worse even after you rest and take medicine  · Your cast gets wet or damaged      · Your leg or toes are numb  · The skin or toes of your injured leg become swollen, cold, or blue  When should I contact my healthcare provider? · You have a fever  · Your cast or brace is too tight  · There are new blood stains or a bad smell coming from under the cast     · You have new or worsening trouble moving your leg  · You have questions or concerns about your condition or care  CARE AGREEMENT:   You have the right to help plan your care  Learn about your health condition and how it may be treated  Discuss treatment options with your caregivers to decide what care you want to receive  You always have the right to refuse treatment  The above information is an  only  It is not intended as medical advice for individual conditions or treatments  Talk to your doctor, nurse or pharmacist before following any medical regimen to see if it is safe and effective for you  © 2017 [employer] Information is for End User's use only and may not be sold, redistributed or otherwise used for commercial purposes  All illustrations and images included in CareNotes® are the copyrighted property of [employer]  or [employer] Padilla  Leg Fracture   AMBULATORY CARE:   A leg fracture  is a break in any of the 3 long bones of your leg  The femur is the largest bone and goes from your hip to your knee  The fibula and tibia are the 2 bones in your lower leg that go from your knee to your ankle  Common signs and symptoms include the following:   · Pain that worsens when you stand on or move your injured leg    · Trouble moving your leg    · Abnormal leg position or shape    · Swelling or bruising    · Weakness or loss of feeling in your leg  Seek care immediately if:   · Your leg feels warm, tender, and painful  It may look swollen and red  · The pain in your injured leg gets worse even after you rest and take medicine  · Your cast gets wet or damaged      · Your leg or toes are numb      · The skin or toes of your injured leg become swollen, cold, or blue  Contact your healthcare provider or bone specialist if:   · You have a fever  · Your cast or brace is too tight  · There are new blood stains or a bad smell coming from under the cast     · You have new or worsening trouble moving your leg  · You have questions or concerns about your condition or care  Treatment  depends on what kind of fracture you have and how bad it is  You may need any of the following:  · A brace, cast, or splint  may be placed on your leg to decrease your leg movement and hold the broken bones in place  These devices may help decrease pain and prevent further bone damage  · Prescription pain medicine  may be given  Ask how to take this medicine safely  · NSAIDs , such as ibuprofen, help decrease swelling, pain, and fever  NSAIDs can cause stomach bleeding or kidney problems in certain people  If you take blood thinner medicine, always ask your healthcare provider if NSAIDs are safe for you  Always read the medicine label and follow directions  · Acetaminophen  decreases pain and fever  It is available without a doctor's order  Ask how much to take and how often to take it  Follow directions  Read the labels of all other medicines you are using to see if they also contain acetaminophen, or ask your doctor or pharmacist  Acetaminophen can cause liver damage if not taken correctly  Do not use more than 4 grams (4,000 milligrams) total of acetaminophen in one day  · External fixation  is surgery to put screws through your skin and into your broken bones  The screws will be secured to a device that is placed outside of your leg  External fixation holds your bones together so they can heal  You may still need open surgery on your leg to fix injured areas after the external fixation is removed  · Open reduction and internal fixation  may be done to put your bones back into the correct position  This may include the use of special wires, pins, plates or screws  These help keep the broken pieces lined up so your leg can heal correctly  · Traction  may be needed if your bone broke into 2 pieces  Traction pulls on the bones to put them back into place  A pin may be put in your bone or cast and hooked to the traction device  Weights are hung from the traction device to help pull the bones into the right position  Cast or splint care:   · Check the skin around your cast and splint daily for any redness or open areas  · Do not use a sharp or pointed object to scratch your skin under the cast or splint  · Do not remove your splint unless your healthcare provider says it is okay  Bathing with a cast or splint:  Do not let your cast or splint get wet  Before bathing, cover the cast or splint with a plastic bag  Tape the bag to your skin above the cast or splint to seal out the water  Keep your leg out of the water in case the bag leaks  Ask when it is okay to take a bath or shower  Self-care:   · Rest  your leg as directed and avoid activities that cause leg pain  · Apply ice  on your leg for 15 to 20 minutes every hour or as directed  Use an ice pack, or put crushed ice in a plastic bag  Cover it with a towel  Ice helps prevent tissue damage and decreases swelling and pain  · Elevate  your leg above the level of your heart as often as you can  This will help decrease swelling and pain  Prop your leg on pillows or blankets to keep it elevated comfortably  · Use crutches or a walker  as directed  Crutches will help you walk and take some weight off your injured leg while it heals  · Physical therapy  may be recommended  A physical therapist teaches you exercises to help improve movement and strength, and to decrease pain  Follow up with your healthcare provider or bone specialist as directed: You may need to return to have your splint or cast removed   You may need an x-ray of your leg to check how well the bone has healed  Write down your questions so you remember to ask them during your visits  © 2017 [employer] Information is for End User's use only and may not be sold, redistributed or otherwise used for commercial purposes  All illustrations and images included in CareNotes® are the copyrighted property of [employer]  or [employer] Padilla  The above information is an  only  It is not intended as medical advice for individual conditions or treatments  Talk to your doctor, nurse or pharmacist before following any medical regimen to see if it is safe and effective for you  Deep Venous Thrombosis   WHAT YOU NEED TO KNOW:   What is deep venous thrombosis? Deep venous thrombosis (DVT) is a blood clot that forms in a deep vein of the body  The deep veins in the legs, thighs, and hips are the most common sites for DVT  DVT can also occur in a deep vein within your arms  The clot prevents the normal flow of blood in the vein  The blood backs up and causes pain and swelling  The DVT can break into smaller pieces and travel to your lungs and cause a blockage called a pulmonary embolism  A pulmonary embolism can become life-threatening         What increases my risk for DVT? You may be at higher risk if you have had DVT before or you have a family history of blood clots  The following conditions also increase your risk:  · Age older than 60 years     · Obesity     · Injury to a deep vein, or surgery     · Use of hormone replacement therapy or birth control medicine such as pills or patches     · Pregnancy, and up to 6 weeks after childbirth      · A blood disorder that makes your blood clot faster than normal, such as factor V Leiden mutation     · Cancer or heart failure      · Limited mobility caused by bed rest, a leg cast, or sitting for long periods     · Varicose veins     · Catheter placed in a large vein  What are the signs and symptoms of DVT? · Swelling     · Redness     · Warmth, pain, or tenderness  How is DVT diagnosed? · A D-dimer blood test may be done to check for signs of a blood clot       · An ultrasound uses sound waves to show pictures on a monitor  An ultrasound may be done to show a clot in your vein       · Contrast venography is an x-ray of a vein  Contrast liquid is used to make the vein easier to see on the x-ray  Tell a healthcare provider if you have ever had an allergic reaction to contrast liquid  How is DVT treated? · Blood thinners help prevent the DVT from getting bigger and prevent new clots from forming  Examples of blood thinners include heparin, rivaroxaban, apixiban, and warfarin  The following are general safety guidelines to follow while you are taking a blood thinner:      ? Watch for bleeding and bruising  Watch for bleeding from your gums or nose  Watch for blood in your urine and bowel movements  Use a soft washcloth on your skin, and a soft toothbrush to brush your teeth  This can keep your skin and gums from bleeding  If you shave, use an electric shaver  Do not play contact sports       ? Tell your dentist and other healthcare providers that you take a blood thinner  Wear a bracelet or necklace that says you take this medicine       ? Do not start or stop any medicines unless your healthcare provider tells you to  Many medicines cannot be used with blood thinners       ? Tell your healthcare provider right away if you forget to take the blood thinner , or if you take too much      ? Warfarin is a blood thinner that you may need to take  The following are additional things you should be aware of if you take warfarin:     § Foods and medicines can affect the amount of warfarin in your blood  Do not make major changes to your diet  Warfarin works best when you eat about the same amount of vitamin K every day  Vitamin K is found in green leafy vegetables and certain other foods   Ask for more information about what to eat or not to eat      § You will need to see your healthcare provider for follow-up visits  You will need regular blood tests to decide how much warfarin you need      · Clot busters are emergency medicines that work to dissolve blood clots  They cannot be used during pregnancy or in people with medical conditions that increase their risk of bleeding       · A vena cava filter may be placed inside your vena cava to treat your DVT  The vena cava is a large vein that brings blood from your lower body up to your heart  The filter traps blood clots and prevents them from going into your lungs       · Surgery , called a thrombectomy, may be done to remove the clot  A procedure called thrombolysis may instead be done to inject a clot buster that helps break the clot apart  How can I manage my DVT? · Wear pressure stockings  The stockings are tight and put pressure on your legs  This improves blood flow and helps prevent clots  Wear the stockings during the day  Do not wear them when you sleep            · Elevate your legs above the level of your heart as often as you can  This will help decrease swelling and pain  Prop your legs on pillows or blankets to keep them elevated comfortably       · Exercise regularly to help increase your blood flow  Walking is a good low-impact exercise  Talk to your healthcare provider about the best exercise plan for you       · Change body positions often  If you travel by car or work at a desk, move and stretch in your seat several times each hour  In an airplane, get up and walk every hour  If you are bedridden, ask for help to change your position every 1 to 2 hours      · Maintain a healthy weight  Ask your healthcare provider how much you should weigh  Ask him to help you create a weight loss plan if you are overweight       · Do not smoke  Nicotine and other chemicals in cigarettes and cigars can damage blood vessels and make it more difficult to manage your DVT   Ask your healthcare provider for information if you currently smoke and need help to quit  E-cigarettes or smokeless tobacco still contain nicotine  Talk to your healthcare provider before you use these products  Call 911 for the following:   · You feel lightheaded, short of breath, and have chest pain      · You cough up blood  When should I seek immediate care? · You develop new DVT symptoms in another leg or arm       When should I contact my healthcare provider? · Your gums or nose bleed      · You see blood in your urine or bowel movements      · Your bowel movements are black or darker than normal      · You have questions or concerns about your conditions or care  CARE AGREEMENT:   You have the right to help plan your care  Learn about your health condition and how it may be treated  Discuss treatment options with your caregivers to decide what care you want to receive  You always have the right to refuse treatment  The above information is an  only  It is not intended as medical advice for individual conditions or treatments  Talk to your doctor, nurse or pharmacist before following any medical regimen to see if it is safe and effective for you  © 2017 [address] [employer] Information is for End User's use only and may not be sold, redistributed or otherwise used for commercial purposes  All illustrations and images included in CareNotes® are the copyrighted property of [employer]  or Nitin Padilla

## 2018-12-06 VITALS
HEIGHT: 65 IN | OXYGEN SATURATION: 96 % | DIASTOLIC BLOOD PRESSURE: 85 MMHG | WEIGHT: 218.7 LBS | RESPIRATION RATE: 18 BRPM | SYSTOLIC BLOOD PRESSURE: 113 MMHG | HEART RATE: 72 BPM | TEMPERATURE: 98 F | BODY MASS INDEX: 36.44 KG/M2

## 2018-12-06 PROBLEM — R50.82 POSTOPERATIVE FEVER: Status: RESOLVED | Noted: 2018-12-04 | Resolved: 2018-12-06

## 2018-12-06 PROBLEM — S82.831A CLOSED FRACTURE OF RIGHT DISTAL FIBULA: Status: RESOLVED | Noted: 2018-12-02 | Resolved: 2018-12-06

## 2018-12-06 PROCEDURE — 97116 GAIT TRAINING THERAPY: CPT

## 2018-12-06 PROCEDURE — 97530 THERAPEUTIC ACTIVITIES: CPT

## 2018-12-06 PROCEDURE — 97110 THERAPEUTIC EXERCISES: CPT

## 2018-12-06 PROCEDURE — 99232 SBSQ HOSP IP/OBS MODERATE 35: CPT | Performed by: INTERNAL MEDICINE

## 2018-12-06 RX ADMIN — ACETAMINOPHEN 650 MG: 325 TABLET ORAL at 10:16

## 2018-12-06 RX ADMIN — FAMOTIDINE 20 MG: 20 TABLET ORAL at 18:24

## 2018-12-06 RX ADMIN — FAMOTIDINE 20 MG: 20 TABLET ORAL at 10:08

## 2018-12-06 RX ADMIN — VENLAFAXINE HYDROCHLORIDE 150 MG: 150 CAPSULE, EXTENDED RELEASE ORAL at 10:07

## 2018-12-06 RX ADMIN — LEVOTHYROXINE SODIUM 100 MCG: 100 TABLET ORAL at 06:51

## 2018-12-06 RX ADMIN — ENOXAPARIN SODIUM 40 MG: 40 INJECTION SUBCUTANEOUS at 10:07

## 2018-12-06 RX ADMIN — ACETAMINOPHEN 650 MG: 325 TABLET ORAL at 18:24

## 2018-12-06 NOTE — SOCIAL WORK
Patient accepted at [facility] for [address]; Cm awaiting insurance authorization

## 2018-12-06 NOTE — PROGRESS NOTES
Progress Note - [address] 63 y o  female MRN: [id number]  Unit/Bed#: E2 -01 Encounter: [id number]    Assessment:  1  S/p Right ankle ORIF by Dr Michelle Medeiros (DOS: 12/3/18)  2  Ambulatory Dysfunction  3  Hypertension  4  Hypothyroidism  5  Atelectasis      Plan:   · PT/OT recommends discharge to rehab, Romana from Case management on board  Currently waiting for authorization to short-term rehab  · Stable for discharge from SLIM standpoint  · NWB to the RLE   · c/w incentive spirometry   · lovenox for DVT ppx post operatively for 30 days   · right lower extremity dressings and cast intact, ROM and sensation intact to the digits   · patient will follow up with Dr Michelle Medeiros in his office in 2 weeks  Dispo: Waiting for authorization to short-term rehab  Subjective/Objective   Chief Complaint:   Chief Complaint   Patient presents with    Ankle Pain     Patient presents following mechanical fall, complaining of right lateral ankle pain  Subjective: 63 y o  y/o female was seen and evaluated at bedside in no acute distress, nontoxic in appearance  Patient denies any recent nausea, vomiting, fever, chills, shortness of breath, chest pains       Blood pressure 105/75, pulse 72, temperature 98 5 °F (36 9 °C), temperature source Tympanic, resp  rate 18, height 5' 5" (1 651 m), weight 99 2 kg (218 lb 11 1 oz), SpO2 98 %  ,Body mass index is 36 39 kg/m²  Invasive Devices     Peripheral Intravenous Line            Peripheral IV 12/02/18 Right Hand 3 days                Physical Exam:   General: Alert, cooperative and no distress  Lungs: Non labored breathing  Heart: Positive S1, S2  Abdomen: Soft, non-tender  Extremity:     RLE:  Cast intact  Able to move digits  Brisk refill time to all digits      Lab, Imaging and other studies:   CBC: No results found for: WBC, HGB, HCT, MCV, PLT, ADJUSTEDWBC, MCH, MCHC, RDW, MPV, NRBC, CMP: No results found for: SODIUM, K, CL, CO2, ANIONGAP, BUN, CREATININE, GLUCOSE, CALCIUM, AST, ALT, ALKPHOS, PROT, BILITOT, EGFR    Imaging: I have personally reviewed pertinent films in PACS  EKG, Pathology, and Other Studies: I have personally reviewed pertinent reports    VTE Pharmacologic Prophylaxis: Enoxaparin (Lovenox)

## 2018-12-06 NOTE — PHYSICAL THERAPY NOTE
Physical Therapy Treatment Note       12/06/18 0953   Pain Assessment   Pain Assessment 0-10   Pain Score 1   Pain Location Ankle   Pain Orientation Right   Pain Descriptors Aching; Sore   Pain Frequency Intermittent   Clinical Progression Rapidly improving   Hospital Pain Intervention(s) Repositioned; Ambulation/increased activity; Elevated; Emotional support; Rest   Response to Interventions tolerated well   Restrictions/Precautions   RLE Weight Bearing Per Order NWB   Other Precautions WBS;Pain; Fall Risk;Impulsive   General   Chart Reviewed Yes   Response to Previous Treatment Patient with no complaints from previous session  Family/Caregiver Present No   Cognition   Overall Cognitive Status WFL   Arousal/Participation Alert; Cooperative   Attention Within functional limits   Orientation Level Oriented X4   Memory Within functional limits   Following Commands Follows multistep commands with increased time or repetition   Subjective   Subjective " I get twinges of pain "     Bed Mobility   Supine to Sit 5  Supervision   Additional items Assist x 1;HOB elevated; Bedrails   Transfers   Sit to Stand 4  Minimal assistance   Additional items Assist x 1; Increased time required;Armrests; Verbal cues   Stand to Sit 4  Minimal assistance   Additional items Assist x 1; Armrests; Increased time required;Verbal cues   Ambulation/Elevation   Gait pattern Decreased foot clearance; Short stride; Inconsistent nazario  (unsteady, hop to gait pattern, fast at times)   Gait Assistance 4  Minimal assist   Additional items Assist x 1;Verbal cues   Assistive Device Rolling walker   Distance 5'x1, 15'x2   Balance   Static Sitting Good   Dynamic Sitting Fair +   Static Standing Poor +   Dynamic Standing Poor +   Ambulatory Poor +   Endurance Deficit   Endurance Deficit Yes   Endurance Deficit Description fatigue   Activity Tolerance   Activity Tolerance Patient limited by fatigue;Patient tolerated treatment well   Nurse Made Aware Katrina MONTELONGO Exercises   Quad Sets Supine;10 reps;AROM; Bilateral   Heelslides Supine;10 reps;AROM; Right   Hip Flexion Supine;10 reps;AROM; Right   Hip Abduction Supine;10 reps;AROM; Right   Hip Adduction Supine;10 reps;AROM; Right   Knee AROM Short Arc Quad Supine;10 reps;AROM; Right   Knee AROM Long Arc Quad Supine;10 reps;AROM; Right   Ankle Pumps Supine;10 reps;AROM; Left   Equipment Use   Comments Pt issued written handout on supine/ sitting HEP  Assessment   Prognosis Good   Problem List Decreased strength;Decreased endurance; Impaired balance;Decreased mobility; Decreased range of motion;Decreased safety awareness; Impaired vision;Pain;Orthopedic restrictions; Obesity   Assessment PT treatment completed  Pt identified by 2 patient identifiers: name and birth date  Pt agreeable to participate and seen for mobility, gait, exercise, balance, activity tolerance/functional endurance and PT education  Precautions include: fall risk, pain, visual impairments and impulsivity  Pt received supine in bed  Pt demonstrates bed mobility supervision assistance x1 with elevation of head of bed and use of bedrail ,transfers minimum assistance x1 and max cues for safe techniques, handplacement, backing up to chair and pulling walker back toward chair all the way, gait with use of rw with hop to gait pattern due to NWB to R le pt demonstrates gait deviations as noted in flowsheet and requires moderate cues for reminders to maintain WBS especially when performing transfers and standing activities minimum assistance x1 required for ambulation, balance impaired with static standing, dynamic standing and ambulation requiring min assist x1 for safety and correction  Pt instructed in and performs supine sitting arom exercises to r le x 10 reps without difficulty and with cues for correct exercise techniques and performance  Pt reports R le pain  to be 1/10  Pt tolerated treatment  well   Pt would benefit from continued skilled PT to continue progress with  mobility, gait, exercise, balance, activity tolerance/functional endurance and PT education  At this time recommend discharge to short term rehab due to deficits in strength, balance, mobility, activity tolerance,and safety as well as increased assistance required for transfers and ambulation and pt's need to negotiate 16 steps to enter her apt and need for mod I as pt lives lone  Pt would benefit from use of rolling walker  At end of PT treatment pt remained seated out of bed in chair with L le elevated on step stool and one pillow, all needs in reach, call bell and phone in hand, and RN aware of patient status  No further questions or concerns for PT at this time  Goals   Patient Goals to be able to walk and negotiate 16 steps to get into my apt "    STG Expiration Date 12/11/18   Treatment Day 1   Plan   Treatment/Interventions Functional transfer training;LE strengthening/ROM; Elevations; Therapeutic exercise; Endurance training;Patient/family training;Equipment eval/education; Bed mobility;Gait training   Progress Progressing toward goals   PT Frequency (5-6x/week)   Recommendation   Recommendation (inpt rehab)   PT - OK to Discharge Yes  (rehab)       Piero Martinez PTA

## 2018-12-06 NOTE — PLAN OF CARE
Problem: PHYSICAL THERAPY ADULT  Goal: Performs mobility at highest level of function for planned discharge setting  See evaluation for individualized goals  Treatment/Interventions: Functional transfer training, LE strengthening/ROM, Elevations, Therapeutic exercise, Endurance training, Patient/family training, Bed mobility, Gait training, Spoke to nursing, OT  Equipment Recommended: Wheelchair, Walker       See flowsheet documentation for full assessment, interventions and recommendations  Outcome: Progressing  Prognosis: Good  Problem List: Decreased strength, Decreased endurance, Impaired balance, Decreased mobility, Decreased range of motion, Decreased safety awareness, Impaired vision, Pain, Orthopedic restrictions, Obesity  Assessment: PT treatment completed  Pt identified by 2 patient identifiers: name and birth date  Pt agreeable to participate and seen for mobility, gait, exercise, balance, activity tolerance/functional endurance and PT education  Precautions include: fall risk, pain, visual impairments and impulsivity  Pt received supine in bed  Pt demonstrates bed mobility supervision assistance x1 with elevation of head of bed and use of bedrail ,transfers minimum assistance x1 and max cues for safe techniques, handplacement, backing up to chair and pulling walker back toward chair all the way, gait with use of rw with hop to gait pattern due to NWB to R le pt demonstrates gait deviations as noted in flowsheet and requires moderate cues for reminders to maintain WBS especially when performing transfers and standing activities minimum assistance x1 required for ambulation, balance impaired with static standing, dynamic standing and ambulation requiring min assist x1 for safety and correction  Pt instructed in and performs supine sitting arom exercises to r le x 10 reps without difficulty and with cues for correct exercise techniques and performance  Pt reports R le pain  to be 1/10   Pt tolerated treatment  well  Pt would benefit from continued skilled PT to continue progress with  mobility, gait, exercise, balance, activity tolerance/functional endurance and PT education  At this time recommend discharge to short term rehab due to deficits in strength, balance, mobility, activity tolerance,and safety as well as increased assistance required for transfers and ambulation and pt's need to negotiate 16 steps to enter her apt and need for mod I as pt lives lone  Pt would benefit from use of rolling walker  At end of PT treatment pt remained seated out of bed in chair with L le elevated on step stool and one pillow, all needs in reach, call bell and phone in hand, and RN aware of patient status  No further questions or concerns for PT at this time  Barriers to Discharge: Inaccessible home environment, Decreased caregiver support  Barriers to Discharge Comments: pt home alone + 16STE  Recommendation:  (inpt rehab)     PT - OK to Discharge: Yes (rehab)    See flowsheet documentation for full assessment
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
## 2018-12-06 NOTE — SOCIAL WORK
CM informed that [facility] has obtained insurance authorization for STR; Auth # [id number]  CM made patient aware of same, patient attempted to call her son to transport to facility; patient unsuccessful with reaching son  CM arranged [address] transportation with [place], patient made aware of cost associated with transportation, patient is agreeable to to fee  CM explained IMM letter and provided patient with copy  CM made physician, RN and UC aware of same  No other needs at present  CM to follow as needed
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
## 2018-12-07 NOTE — NURSING NOTE
Patient discharged by wheelchair Nat mccartney to [facility]; pick-up 1945; jessica MONTELONGO, Taniya Azul, called reported to receiving facility; IV removed; patient belongings sent with patient upon discharge

## 2019-01-28 DIAGNOSIS — E03.9 HYPOTHYROIDISM, UNSPECIFIED TYPE: ICD-10-CM

## 2019-01-28 RX ORDER — LEVOTHYROXINE SODIUM 0.1 MG/1
100 TABLET ORAL DAILY
Qty: 30 TABLET | Refills: 0 | Status: SHIPPED | OUTPATIENT
Start: 2019-01-28 | End: 2019-03-05 | Stop reason: SDUPTHER

## 2019-01-28 NOTE — TELEPHONE ENCOUNTER
Patient called and requested a medication refill for levothyroxine 100 mcg tablet-take 1 tablet (100 mcg total) by mouth daily    She would like this called into [facility] [phone]

## 2019-03-05 DIAGNOSIS — E03.9 HYPOTHYROIDISM, UNSPECIFIED TYPE: ICD-10-CM

## 2019-03-05 RX ORDER — LEVOTHYROXINE SODIUM 0.1 MG/1
100 TABLET ORAL DAILY
Qty: 30 TABLET | Refills: 0 | Status: SHIPPED | OUTPATIENT
Start: 2019-03-05 | End: 2019-04-05 | Stop reason: SDUPTHER

## 2019-04-05 DIAGNOSIS — I10 ESSENTIAL HYPERTENSION: Primary | ICD-10-CM

## 2019-04-05 DIAGNOSIS — E03.9 HYPOTHYROIDISM, UNSPECIFIED TYPE: ICD-10-CM

## 2019-04-05 RX ORDER — LEVOTHYROXINE SODIUM 0.1 MG/1
100 TABLET ORAL DAILY
Qty: 90 TABLET | Refills: 0 | Status: SHIPPED | OUTPATIENT
Start: 2019-04-05 | End: 2019-07-08 | Stop reason: SDUPTHER

## 2019-04-05 RX ORDER — LOSARTAN POTASSIUM 100 MG/1
100 TABLET ORAL DAILY
Qty: 90 TABLET | Refills: 0 | Status: SHIPPED | OUTPATIENT
Start: 2019-04-05 | End: 2019-07-24 | Stop reason: SDUPTHER

## 2019-07-08 DIAGNOSIS — E03.9 HYPOTHYROIDISM, UNSPECIFIED TYPE: ICD-10-CM

## 2019-07-08 RX ORDER — LEVOTHYROXINE SODIUM 0.1 MG/1
100 TABLET ORAL DAILY
Qty: 30 TABLET | Refills: 0 | Status: SHIPPED | OUTPATIENT
Start: 2019-07-08

## 2019-07-08 NOTE — TELEPHONE ENCOUNTER
Patient is requesting a medication refill on Levothyroxine 100 mcg , state she has 1 pill left   Explained to patient that she was last seen in 9/19/2018, Offered patient an appointment and she declined , state she doesn't live in [facility] and has no way of getting here   Patient will eventually switch Physicians

## 2019-07-24 DIAGNOSIS — I10 ESSENTIAL HYPERTENSION: ICD-10-CM

## 2019-07-24 RX ORDER — LOSARTAN POTASSIUM 100 MG/1
100 TABLET ORAL DAILY
Qty: 90 TABLET | Refills: 0 | Status: SHIPPED | OUTPATIENT
Start: 2019-07-24

## 2019-10-30 DIAGNOSIS — Z12.39 SCREENING FOR BREAST CANCER: Primary | ICD-10-CM

## 2020-04-02 DIAGNOSIS — E03.9 HYPOTHYROIDISM, UNSPECIFIED TYPE: ICD-10-CM

## 2020-04-03 RX ORDER — LEVOTHYROXINE SODIUM 0.1 MG/1
TABLET ORAL
Qty: 90 TABLET | OUTPATIENT
Start: 2020-04-03

## 2020-07-07 ENCOUNTER — TELEPHONE (OUTPATIENT)
Dept: FAMILY MEDICINE CLINIC | Facility: CLINIC | Age: 60
End: 2020-07-07

## 2020-07-07 NOTE — TELEPHONE ENCOUNTER
----- Message from Callie Del Rio MA sent at 7/7/2020 10:37 AM EDT -----  This pt has been seen with-in the last 3 years, however has not seen with-in the past year  Please call pt to make well / follow-up visit  After 3 attempts send back to [facility] to send letter

## 2023-11-21 NOTE — TELEPHONE ENCOUNTER
Please call Pt and find out what Pharmacy this needs to be sent to  Update History & Physical    The patient's History and Physical of October 24, 2023 was reviewed with the patient and I examined the patient. There was no change. The surgical site was confirmed by the patient and me. Plan: The risks, benefits, expected outcome, and alternative to the recommended procedure have been discussed with the patient. Patient understands and wants to proceed with the procedure.      Electronically signed by Luís Daley DO on 11/21/2023 at 9:23 AM

## 2024-07-26 ENCOUNTER — DOCTOR'S OFFICE (OUTPATIENT)
Dept: URBAN - NONMETROPOLITAN AREA CLINIC 1 | Facility: CLINIC | Age: 64
Setting detail: OPHTHALMOLOGY
End: 2024-07-26
Payer: COMMERCIAL

## 2024-07-26 ENCOUNTER — RX ONLY (RX ONLY)
Age: 64
End: 2024-07-26

## 2024-07-26 DIAGNOSIS — H50.112: ICD-10-CM

## 2024-07-26 DIAGNOSIS — H53.2: ICD-10-CM

## 2024-07-26 DIAGNOSIS — H25.13: ICD-10-CM

## 2024-07-26 DIAGNOSIS — H35.373: ICD-10-CM

## 2024-07-26 DIAGNOSIS — H05.242: ICD-10-CM

## 2024-07-26 DIAGNOSIS — H43.813: ICD-10-CM

## 2024-07-26 PROCEDURE — 92134 CPTRZ OPH DX IMG PST SGM RTA: CPT | Performed by: OPHTHALMOLOGY

## 2024-07-26 PROCEDURE — 92004 COMPRE OPH EXAM NEW PT 1/>: CPT | Performed by: OPHTHALMOLOGY

## 2024-07-26 ASSESSMENT — LID POSITION - COMMENTS: OS_COMMENTS: INFERIOR SCLERAL SHOW

## 2024-07-26 ASSESSMENT — CONFRONTATIONAL VISUAL FIELD TEST (CVF)
OD_FINDINGS: FULL
OS_FINDINGS: FULL

## 2024-07-30 PROBLEM — S05.92XA: Status: ACTIVE | Noted: 2024-07-26

## 2024-08-12 ENCOUNTER — DOCTOR'S OFFICE (OUTPATIENT)
Dept: URBAN - NONMETROPOLITAN AREA CLINIC 1 | Facility: CLINIC | Age: 64
Setting detail: OPHTHALMOLOGY
End: 2024-08-12
Payer: COMMERCIAL

## 2024-08-12 DIAGNOSIS — H57.04: ICD-10-CM

## 2024-08-12 DIAGNOSIS — H50.10: ICD-10-CM

## 2024-08-12 DIAGNOSIS — H53.2: ICD-10-CM

## 2024-08-12 PROCEDURE — 92060 SENSORIMOTOR EXAMINATION: CPT | Performed by: OPHTHALMOLOGY

## 2024-08-12 PROCEDURE — FRESNELPRI FRENSEL PRISM USED: Performed by: OPHTHALMOLOGY

## 2024-08-12 PROCEDURE — 99214 OFFICE O/P EST MOD 30 MIN: CPT | Performed by: OPHTHALMOLOGY

## 2024-08-12 ASSESSMENT — LID POSITION - COMMENTS: OS_COMMENTS: INFERIOR SCLERAL SHOW

## 2024-08-27 PROBLEM — H50.10: Status: ACTIVE | Noted: 2024-08-12

## 2024-08-27 PROBLEM — H57.04: Status: ACTIVE | Noted: 2024-08-12

## 2024-09-19 ENCOUNTER — DOCTOR'S OFFICE (OUTPATIENT)
Dept: URBAN - NONMETROPOLITAN AREA CLINIC 1 | Facility: CLINIC | Age: 64
Setting detail: OPHTHALMOLOGY
End: 2024-09-19
Payer: COMMERCIAL

## 2024-09-19 DIAGNOSIS — H05.242: ICD-10-CM

## 2024-09-19 DIAGNOSIS — H57.04: ICD-10-CM

## 2024-09-19 DIAGNOSIS — H50.10: ICD-10-CM

## 2024-09-19 DIAGNOSIS — H53.2: ICD-10-CM

## 2024-09-19 PROBLEM — S06.0X0A: Status: ACTIVE | Noted: 2024-09-19

## 2024-09-19 PROBLEM — H55.09: Status: ACTIVE | Noted: 2024-09-19

## 2024-09-19 PROBLEM — H18.522 EPITHELIAL CORNEAL DYSTROPHY / ANTERIOR BASEMENT MEMBRANE DYSTROPHY/ MAP-DOT-FINGERPRINT DYSTROPHY; LEFT EYE: Status: ACTIVE | Noted: 2024-09-19

## 2024-09-19 PROBLEM — S05.92XD: Status: ACTIVE | Noted: 2024-09-19

## 2024-09-19 PROBLEM — H16.223 DRY EYE SYNDROME K SICCA; BOTH EYES: Status: ACTIVE | Noted: 2024-09-19

## 2024-09-19 PROCEDURE — 99213 OFFICE O/P EST LOW 20 MIN: CPT | Performed by: OPHTHALMOLOGY

## 2024-09-19 ASSESSMENT — LID POSITION - COMMENTS: OS_COMMENTS: INFERIOR SCLERAL SHOW

## 2024-09-19 ASSESSMENT — REFRACTION_CURRENTRX
OD_VPRISM_DIRECTION: SV
OS_VPRISM_DIRECTION: SV
OD_OVR_VA: 20/
OS_AXIS: 004
OD_SPHERE: -3.25
OS_OVR_VA: 20/
OS_CYLINDER: -1.75
OD_AXIS: 008
OD_CYLINDER: -1.25
OS_SPHERE: -2.25

## 2024-09-19 ASSESSMENT — REFRACTION_AUTOREFRACTION
OD_SPHERE: -3.25
OS_CYLINDER: +1.00
OS_AXIS: 081
OD_CYLINDER: +1.25
OD_AXIS: 108
OS_SPHERE: -1.75

## 2024-09-19 ASSESSMENT — KERATOMETRY
OD_K1POWER_DIOPTERS: 39.25
OS_K2POWER_DIOPTERS: 40.50
OS_K1POWER_DIOPTERS: 40.00
OD_K2POWER_DIOPTERS: 41.25
OD_AXISANGLE_DEGREES: 084
OS_AXISANGLE_DEGREES: 107

## 2024-09-19 ASSESSMENT — CONFRONTATIONAL VISUAL FIELD TEST (CVF)
OS_FINDINGS: FULL
OD_FINDINGS: FULL

## 2024-09-19 ASSESSMENT — VISUAL ACUITY
OD_BCVA: 20/80-1
OS_BCVA: 20/30

## 2024-10-16 ENCOUNTER — OPTICAL OFFICE (OUTPATIENT)
Dept: URBAN - NONMETROPOLITAN AREA CLINIC 4 | Facility: CLINIC | Age: 64
Setting detail: OPHTHALMOLOGY
End: 2024-10-16
Payer: COMMERCIAL

## 2024-10-16 ENCOUNTER — DOCTOR'S OFFICE (OUTPATIENT)
Dept: URBAN - NONMETROPOLITAN AREA CLINIC 1 | Facility: CLINIC | Age: 64
Setting detail: OPHTHALMOLOGY
End: 2024-10-16
Payer: COMMERCIAL

## 2024-10-16 DIAGNOSIS — H52.223: ICD-10-CM

## 2024-10-16 DIAGNOSIS — H53.2: ICD-10-CM

## 2024-10-16 DIAGNOSIS — H52.4: ICD-10-CM

## 2024-10-16 PROCEDURE — V2744 TINT PHOTOCHROMATIC LENS/ES: HCPCS | Performed by: OPTOMETRIST

## 2024-10-16 PROCEDURE — V2784 LENS POLYCARB OR EQUAL: HCPCS | Performed by: OPTOMETRIST

## 2024-10-16 PROCEDURE — V2784 LENS POLYCARB OR EQUAL: HCPCS | Mod: LT | Performed by: OPTOMETRIST

## 2024-10-16 PROCEDURE — V2715 PRISM LENS/ES: HCPCS | Mod: LT | Performed by: OPTOMETRIST

## 2024-10-16 PROCEDURE — V2103 SPHEROCYLINDR 4.00D/12-2.00D: HCPCS | Mod: LT | Performed by: OPTOMETRIST

## 2024-10-16 PROCEDURE — 92012 INTRM OPH EXAM EST PATIENT: CPT | Performed by: OPTOMETRIST

## 2024-10-16 PROCEDURE — V2020 VISION SVCS FRAMES PURCHASES: HCPCS | Performed by: OPTOMETRIST

## 2024-10-16 PROCEDURE — V2715 PRISM LENS/ES: HCPCS | Performed by: OPTOMETRIST

## 2024-10-16 PROCEDURE — V2103 SPHEROCYLINDR 4.00D/12-2.00D: HCPCS | Performed by: OPTOMETRIST

## 2024-10-16 PROCEDURE — V2744 TINT PHOTOCHROMATIC LENS/ES: HCPCS | Mod: LT | Performed by: OPTOMETRIST

## 2024-10-16 ASSESSMENT — REFRACTION_MANIFEST
OS_ADD: +2.50
OS_VA1: 20/30+2
OD_CYLINDER: -1.25
OS_SPHERE: -1.25
OD_VA1: 20/25-2
OD_AXIS: 020
OS_CYLINDER: -0.75
OS_AXIS: 015
OD_ADD: +2.50
OD_VPRISM: BU
OD_VA2: 20/25-2
OS_VA2: 20/30+2
OS_VPRISM: BD
OD_SPHERE: -2.00

## 2024-10-16 ASSESSMENT — REFRACTION_CURRENTRX
OD_OVR_VA: 20/
OD_VPRISM_DIRECTION: SV
OS_OVR_VA: 20/
OS_AXIS: 004
OS_CYLINDER: -1.75
OS_VPRISM_DIRECTION: SV
OD_AXIS: 008
OD_CYLINDER: -1.25
OS_SPHERE: -2.25
OD_SPHERE: -3.25

## 2024-10-16 ASSESSMENT — KERATOMETRY
OS_K1POWER_DIOPTERS: 40.00
OD_K2POWER_DIOPTERS: 41.25
OD_AXISANGLE_DEGREES: 084
OS_AXISANGLE_DEGREES: 107
OS_K2POWER_DIOPTERS: 40.50
OD_K1POWER_DIOPTERS: 39.25

## 2024-10-16 ASSESSMENT — REFRACTION_AUTOREFRACTION
OS_CYLINDER: -0.50
OS_AXIS: 016
OS_SPHERE: -1.00
OD_SPHERE: -2.00
OD_CYLINDER: -1.25
OD_AXIS: 021

## 2024-10-16 ASSESSMENT — LID POSITION - COMMENTS: OS_COMMENTS: INFERIOR SCLERAL SHOW

## 2024-10-16 ASSESSMENT — VISUAL ACUITY
OD_BCVA: 20/70-1
OS_BCVA: 20/50-2

## (undated) DEVICE — CURITY NON-ADHERENT STRIPS: Brand: CURITY

## (undated) DEVICE — PENCIL ELECTROSURG E-Z CLEAN -0035H

## (undated) DEVICE — PADDING CAST 4 IN  COTTON STRL

## (undated) DEVICE — DRILL BIT 3.5MM

## (undated) DEVICE — SCD SEQUENTIAL COMPRESSION COMFORT SLEEVE MEDIUM KNEE LENGTH: Brand: KENDALL SCD

## (undated) DEVICE — SYRINGE 10ML LL

## (undated) DEVICE — ARTHREX DRILL BIT 2.5MM ANKLE FRACTURE

## (undated) DEVICE — CHLORAPREP HI-LITE 26ML ORANGE

## (undated) DEVICE — PAD CAST 4 IN COTTON NON STERILE

## (undated) DEVICE — CAST PADDING 4 IN SYNTHETIC NON-STRL

## (undated) DEVICE — ARTHREX DRILL BIT 2.5MM

## (undated) DEVICE — INTENDED FOR TISSUE SEPARATION, AND OTHER PROCEDURES THAT REQUIRE A SHARP SURGICAL BLADE TO PUNCTURE OR CUT.: Brand: BARD-PARKER ® CARBON RIB-BACK BLADES

## (undated) DEVICE — SUT VICRYL 2-0 SH 27 IN UNDYED J417H

## (undated) DEVICE — DRAPE EQUIPMENT RF WAND

## (undated) DEVICE — BETHLEHEM UNIVERSAL  MIONR EXT: Brand: CARDINAL HEALTH

## (undated) DEVICE — CUFF TOURNIQUET 30 X 4 IN QUICK CONNECT DISP 1BLA

## (undated) DEVICE — 10FR FRAZIER SUCTION HANDLE: Brand: CARDINAL HEALTH

## (undated) DEVICE — ACE WRAP 4 IN UNSTERILE

## (undated) DEVICE — 3000CC GUARDIAN II: Brand: GUARDIAN

## (undated) DEVICE — DRAPE C-ARM X-RAY

## (undated) DEVICE — WEBRIL 6 IN UNSTERILE

## (undated) DEVICE — PROXIMATE SKIN STAPLERS (35 WIDE) CONTAINS 35 STAINLESS STEEL STAPLES (FIXED HEAD): Brand: PROXIMATE

## (undated) DEVICE — GLOVE SRG BIOGEL 7.5

## (undated) DEVICE — TUBING SUCTION 5MM X 12 FT

## (undated) DEVICE — DRAPE C-ARMOUR

## (undated) DEVICE — GLOVE INDICATOR PI UNDERGLOVE SZ 7.5 BLUE

## (undated) DEVICE — TAPE CAST 4IN FIBERGLASS 4YD WHITE

## (undated) DEVICE — SUT VICRYL 3-0 SH 27 IN J416H